# Patient Record
Sex: MALE | Race: WHITE | Employment: OTHER | URBAN - NONMETROPOLITAN AREA
[De-identification: names, ages, dates, MRNs, and addresses within clinical notes are randomized per-mention and may not be internally consistent; named-entity substitution may affect disease eponyms.]

---

## 2017-06-05 ENCOUNTER — HOSPITAL ENCOUNTER (OUTPATIENT)
Dept: NON INVASIVE DIAGNOSTICS | Age: 32
Discharge: HOME | End: 2017-06-05
Payer: COMMERCIAL

## 2017-06-05 LAB
LV EF: 60 %
LVEF MODALITY: NORMAL

## 2017-06-05 PROCEDURE — 93306 TTE W/DOPPLER COMPLETE: CPT

## 2017-06-05 PROCEDURE — 93225 XTRNL ECG REC<48 HRS REC: CPT

## 2021-05-21 ENCOUNTER — HOSPITAL ENCOUNTER (OUTPATIENT)
Dept: NON INVASIVE DIAGNOSTICS | Age: 36
Discharge: HOME | End: 2021-05-21
Payer: COMMERCIAL

## 2021-05-21 PROCEDURE — 93226 XTRNL ECG REC<48 HR SCAN A/R: CPT

## 2021-05-21 PROCEDURE — 93225 XTRNL ECG REC<48 HRS REC: CPT

## 2021-05-25 LAB
ACQUISITION DURATION: NORMAL S
AVERAGE HEART RATE: 87 BPM
EKG DIAGNOSIS: NORMAL
HOLTER MAX HEART RATE: 136 BPM
HOOKUP DATE: NORMAL
HOOKUP TIME: NORMAL
MAX HEART RATE TIME/DATE: NORMAL
MIN HEART RATE TIME/DATE: NORMAL
MIN HEART RATE: 60 BPM
NUMBER OF QRS COMPLEXES: NORMAL
NUMBER OF SUPRAVENTRICULAR COUPLETS: 0
NUMBER OF SUPRAVENTRICULAR ECTOPICS: 4
NUMBER OF SUPRAVENTRICULAR ISOLATED BEATS: 4
NUMBER OF VENTRICULAR BIGEMINAL CYCLES: 0
NUMBER OF VENTRICULAR COUPLETS: 0
NUMBER OF VENTRICULAR ECTOPICS: 15

## 2021-07-15 ENCOUNTER — HOSPITAL ENCOUNTER (EMERGENCY)
Age: 36
Discharge: ANOTHER HOSPITAL | End: 2021-07-16
Attending: FAMILY MEDICINE
Payer: COMMERCIAL

## 2021-07-15 DIAGNOSIS — R45.851 DEPRESSION WITH SUICIDAL IDEATION: Primary | ICD-10-CM

## 2021-07-15 DIAGNOSIS — F32.A DEPRESSION WITH SUICIDAL IDEATION: Primary | ICD-10-CM

## 2021-07-15 DIAGNOSIS — F10.929 ACUTE ALCOHOLIC INTOXICATION WITH COMPLICATION (HCC): ICD-10-CM

## 2021-07-15 LAB
ABSOLUTE EOS #: 0.09 K/UL (ref 0–0.44)
ABSOLUTE IMMATURE GRANULOCYTE: <0.03 K/UL (ref 0–0.3)
ABSOLUTE LYMPH #: 1.75 K/UL (ref 1.1–3.7)
ABSOLUTE MONO #: 0.58 K/UL (ref 0.1–1.2)
AMPHETAMINE SCREEN URINE: NEGATIVE
BARBITURATE SCREEN URINE: NEGATIVE
BASOPHILS # BLD: 1 % (ref 0–2)
BASOPHILS ABSOLUTE: 0.04 K/UL (ref 0–0.2)
BENZODIAZEPINE SCREEN, URINE: NEGATIVE
BUPRENORPHINE URINE: NEGATIVE
CANNABINOID SCREEN URINE: NEGATIVE
COCAINE METABOLITE, URINE: NEGATIVE
DIFFERENTIAL TYPE: NORMAL
EOSINOPHILS RELATIVE PERCENT: 2 % (ref 1–4)
HCT VFR BLD CALC: 40.9 % (ref 40.7–50.3)
HEMOGLOBIN: 13.7 G/DL (ref 13–17)
IMMATURE GRANULOCYTES: 0 %
LYMPHOCYTES # BLD: 29 % (ref 24–43)
MCH RBC QN AUTO: 31.7 PG (ref 25.2–33.5)
MCHC RBC AUTO-ENTMCNC: 33.5 G/DL (ref 28.4–34.8)
MCV RBC AUTO: 94.7 FL (ref 82.6–102.9)
MDMA URINE: NORMAL
METHADONE SCREEN, URINE: NEGATIVE
METHAMPHETAMINE, URINE: NEGATIVE
MONOCYTES # BLD: 10 % (ref 3–12)
NRBC AUTOMATED: 0 PER 100 WBC
OPIATES, URINE: NEGATIVE
OXYCODONE SCREEN URINE: NEGATIVE
PDW BLD-RTO: 12.7 % (ref 11.8–14.4)
PHENCYCLIDINE, URINE: NEGATIVE
PLATELET # BLD: 159 K/UL (ref 138–453)
PLATELET ESTIMATE: NORMAL
PMV BLD AUTO: 9.6 FL (ref 8.1–13.5)
PROPOXYPHENE, URINE: NEGATIVE
RBC # BLD: 4.32 M/UL (ref 4.21–5.77)
RBC # BLD: NORMAL 10*6/UL
SEG NEUTROPHILS: 58 % (ref 36–65)
SEGMENTED NEUTROPHILS ABSOLUTE COUNT: 3.53 K/UL (ref 1.5–8.1)
TEST INFORMATION: NORMAL
TRICYCLIC ANTIDEPRESSANTS, UR: NEGATIVE
WBC # BLD: 6 K/UL (ref 3.5–11.3)
WBC # BLD: NORMAL 10*3/UL

## 2021-07-15 PROCEDURE — 36415 COLL VENOUS BLD VENIPUNCTURE: CPT

## 2021-07-15 PROCEDURE — 83735 ASSAY OF MAGNESIUM: CPT

## 2021-07-15 PROCEDURE — 93005 ELECTROCARDIOGRAM TRACING: CPT | Performed by: FAMILY MEDICINE

## 2021-07-15 PROCEDURE — 80143 DRUG ASSAY ACETAMINOPHEN: CPT

## 2021-07-15 PROCEDURE — 80306 DRUG TEST PRSMV INSTRMNT: CPT

## 2021-07-15 PROCEDURE — G0480 DRUG TEST DEF 1-7 CLASSES: HCPCS

## 2021-07-15 PROCEDURE — 85025 COMPLETE CBC W/AUTO DIFF WBC: CPT

## 2021-07-15 PROCEDURE — 99285 EMERGENCY DEPT VISIT HI MDM: CPT

## 2021-07-15 PROCEDURE — 80053 COMPREHEN METABOLIC PANEL: CPT

## 2021-07-15 PROCEDURE — 87635 SARS-COV-2 COVID-19 AMP PRB: CPT

## 2021-07-15 PROCEDURE — 80179 DRUG ASSAY SALICYLATE: CPT

## 2021-07-15 RX ORDER — IBUPROFEN 200 MG
800 TABLET ORAL EVERY 8 HOURS PRN
COMMUNITY

## 2021-07-15 RX ORDER — METOPROLOL SUCCINATE 50 MG/1
50 TABLET, EXTENDED RELEASE ORAL DAILY
Status: ON HOLD | COMMUNITY
End: 2021-07-16 | Stop reason: HOSPADM

## 2021-07-15 ASSESSMENT — PATIENT HEALTH QUESTIONNAIRE - PHQ9: SUM OF ALL RESPONSES TO PHQ QUESTIONS 1-9: 9

## 2021-07-16 ENCOUNTER — HOSPITAL ENCOUNTER (INPATIENT)
Age: 36
LOS: 1 days | Discharge: HOME | DRG: 881 | End: 2021-07-17
Attending: PSYCHIATRY & NEUROLOGY | Admitting: PSYCHIATRY & NEUROLOGY
Payer: COMMERCIAL

## 2021-07-16 VITALS
HEIGHT: 69 IN | BODY MASS INDEX: 20.73 KG/M2 | DIASTOLIC BLOOD PRESSURE: 97 MMHG | RESPIRATION RATE: 20 BRPM | OXYGEN SATURATION: 96 % | SYSTOLIC BLOOD PRESSURE: 180 MMHG | WEIGHT: 140 LBS | HEART RATE: 76 BPM

## 2021-07-16 PROBLEM — R45.851 DEPRESSION WITH SUICIDAL IDEATION: Status: ACTIVE | Noted: 2021-07-16

## 2021-07-16 PROBLEM — F10.10 ALCOHOL ABUSE: Status: ACTIVE | Noted: 2021-07-16

## 2021-07-16 PROBLEM — F32.A DEPRESSION WITH SUICIDAL IDEATION: Status: ACTIVE | Noted: 2021-07-16

## 2021-07-16 LAB
ACETAMINOPHEN LEVEL: <5 UG/ML (ref 10–30)
ALBUMIN SERPL-MCNC: 4.2 G/DL (ref 3.5–5.2)
ALBUMIN/GLOBULIN RATIO: 2.2 (ref 1–2.5)
ALP BLD-CCNC: 45 U/L (ref 40–129)
ALT SERPL-CCNC: 21 U/L (ref 5–41)
ANION GAP SERPL CALCULATED.3IONS-SCNC: 10 MMOL/L (ref 9–17)
AST SERPL-CCNC: 28 U/L
BILIRUB SERPL-MCNC: 0.24 MG/DL (ref 0.3–1.2)
BUN BLDV-MCNC: 8 MG/DL (ref 6–20)
BUN/CREAT BLD: 9 (ref 9–20)
CALCIUM SERPL-MCNC: 8 MG/DL (ref 8.6–10.4)
CHLORIDE BLD-SCNC: 109 MMOL/L (ref 98–107)
CO2: 23 MMOL/L (ref 20–31)
CREAT SERPL-MCNC: 0.85 MG/DL (ref 0.7–1.2)
EKG ATRIAL RATE: 63 BPM
EKG P AXIS: 74 DEGREES
EKG P-R INTERVAL: 142 MS
EKG Q-T INTERVAL: 432 MS
EKG QRS DURATION: 86 MS
EKG QTC CALCULATION (BAZETT): 442 MS
EKG R AXIS: 78 DEGREES
EKG T AXIS: 47 DEGREES
EKG VENTRICULAR RATE: 63 BPM
ETHANOL PERCENT: 0.11 %
ETHANOL PERCENT: 0.15 %
ETHANOL: 106 MG/DL
ETHANOL: 155 MG/DL
GFR AFRICAN AMERICAN: >60 ML/MIN
GFR NON-AFRICAN AMERICAN: >60 ML/MIN
GFR SERPL CREATININE-BSD FRML MDRD: ABNORMAL ML/MIN/{1.73_M2}
GFR SERPL CREATININE-BSD FRML MDRD: ABNORMAL ML/MIN/{1.73_M2}
GLUCOSE BLD-MCNC: 89 MG/DL (ref 70–99)
MAGNESIUM: 2.1 MG/DL (ref 1.6–2.6)
POTASSIUM SERPL-SCNC: 3.4 MMOL/L (ref 3.7–5.3)
SALICYLATE LEVEL: <1 MG/DL (ref 3–10)
SARS-COV-2, RAPID: NOT DETECTED
SODIUM BLD-SCNC: 142 MMOL/L (ref 135–144)
SPECIMEN DESCRIPTION: NORMAL
TOTAL PROTEIN: 6.1 G/DL (ref 6.4–8.3)

## 2021-07-16 PROCEDURE — G0480 DRUG TEST DEF 1-7 CLASSES: HCPCS

## 2021-07-16 PROCEDURE — 1240000000 HC EMOTIONAL WELLNESS R&B

## 2021-07-16 PROCEDURE — 6370000000 HC RX 637 (ALT 250 FOR IP)

## 2021-07-16 PROCEDURE — 99223 1ST HOSP IP/OBS HIGH 75: CPT | Performed by: PSYCHIATRY & NEUROLOGY

## 2021-07-16 PROCEDURE — APPSS60 APP SPLIT SHARED TIME 46-60 MINUTES: Performed by: NURSE PRACTITIONER

## 2021-07-16 PROCEDURE — 6370000000 HC RX 637 (ALT 250 FOR IP): Performed by: NURSE PRACTITIONER

## 2021-07-16 PROCEDURE — 93010 ELECTROCARDIOGRAM REPORT: CPT | Performed by: INTERNAL MEDICINE

## 2021-07-16 RX ORDER — ACETAMINOPHEN 325 MG/1
650 TABLET ORAL EVERY 4 HOURS PRN
Status: DISCONTINUED | OUTPATIENT
Start: 2021-07-16 | End: 2021-07-17 | Stop reason: HOSPADM

## 2021-07-16 RX ORDER — LORAZEPAM 2 MG/ML
4 INJECTION INTRAMUSCULAR
Status: DISCONTINUED | OUTPATIENT
Start: 2021-07-16 | End: 2021-07-17 | Stop reason: HOSPADM

## 2021-07-16 RX ORDER — METOPROLOL SUCCINATE 50 MG/1
50 TABLET, EXTENDED RELEASE ORAL DAILY
Qty: 30 TABLET | Refills: 3 | Status: SHIPPED | OUTPATIENT
Start: 2021-07-17

## 2021-07-16 RX ORDER — HYDROXYZINE 50 MG/1
50 TABLET, FILM COATED ORAL 3 TIMES DAILY PRN
Status: DISCONTINUED | OUTPATIENT
Start: 2021-07-16 | End: 2021-07-17 | Stop reason: HOSPADM

## 2021-07-16 RX ORDER — M-VIT,TX,IRON,MINS/CALC/FOLIC 27MG-0.4MG
1 TABLET ORAL DAILY
Qty: 30 TABLET | Refills: 0 | Status: SHIPPED | OUTPATIENT
Start: 2021-07-16

## 2021-07-16 RX ORDER — GAUZE BANDAGE 2" X 2"
100 BANDAGE TOPICAL DAILY
Status: DISCONTINUED | OUTPATIENT
Start: 2021-07-16 | End: 2021-07-17 | Stop reason: HOSPADM

## 2021-07-16 RX ORDER — THIAMINE MONONITRATE (VIT B1) 100 MG
100 TABLET ORAL DAILY
Qty: 30 TABLET | Refills: 0 | Status: SHIPPED | OUTPATIENT
Start: 2021-07-16

## 2021-07-16 RX ORDER — IBUPROFEN 400 MG/1
400 TABLET ORAL EVERY 6 HOURS PRN
Status: DISCONTINUED | OUTPATIENT
Start: 2021-07-16 | End: 2021-07-17 | Stop reason: HOSPADM

## 2021-07-16 RX ORDER — POLYETHYLENE GLYCOL 3350 17 G/17G
17 POWDER, FOR SOLUTION ORAL DAILY PRN
Status: DISCONTINUED | OUTPATIENT
Start: 2021-07-16 | End: 2021-07-17 | Stop reason: HOSPADM

## 2021-07-16 RX ORDER — TRAZODONE HYDROCHLORIDE 50 MG/1
50 TABLET ORAL NIGHTLY PRN
Qty: 30 TABLET | Refills: 0 | Status: ON HOLD | OUTPATIENT
Start: 2021-07-16 | End: 2022-06-19 | Stop reason: HOSPADM

## 2021-07-16 RX ORDER — LORAZEPAM 1 MG/1
1 TABLET ORAL
Status: DISCONTINUED | OUTPATIENT
Start: 2021-07-16 | End: 2021-07-17 | Stop reason: HOSPADM

## 2021-07-16 RX ORDER — M-VIT,TX,IRON,MINS/CALC/FOLIC 27MG-0.4MG
1 TABLET ORAL DAILY
Status: DISCONTINUED | OUTPATIENT
Start: 2021-07-16 | End: 2021-07-17 | Stop reason: HOSPADM

## 2021-07-16 RX ORDER — LORAZEPAM 2 MG/ML
1 INJECTION INTRAMUSCULAR
Status: DISCONTINUED | OUTPATIENT
Start: 2021-07-16 | End: 2021-07-17 | Stop reason: HOSPADM

## 2021-07-16 RX ORDER — MAGNESIUM HYDROXIDE/ALUMINUM HYDROXICE/SIMETHICONE 120; 1200; 1200 MG/30ML; MG/30ML; MG/30ML
30 SUSPENSION ORAL EVERY 6 HOURS PRN
Status: DISCONTINUED | OUTPATIENT
Start: 2021-07-16 | End: 2021-07-17 | Stop reason: HOSPADM

## 2021-07-16 RX ORDER — LORAZEPAM 2 MG/ML
3 INJECTION INTRAMUSCULAR
Status: DISCONTINUED | OUTPATIENT
Start: 2021-07-16 | End: 2021-07-17 | Stop reason: HOSPADM

## 2021-07-16 RX ORDER — LORAZEPAM 1 MG/1
4 TABLET ORAL
Status: DISCONTINUED | OUTPATIENT
Start: 2021-07-16 | End: 2021-07-17 | Stop reason: HOSPADM

## 2021-07-16 RX ORDER — LORAZEPAM 2 MG/ML
2 INJECTION INTRAMUSCULAR
Status: DISCONTINUED | OUTPATIENT
Start: 2021-07-16 | End: 2021-07-17 | Stop reason: HOSPADM

## 2021-07-16 RX ORDER — LORAZEPAM 1 MG/1
3 TABLET ORAL
Status: DISCONTINUED | OUTPATIENT
Start: 2021-07-16 | End: 2021-07-17 | Stop reason: HOSPADM

## 2021-07-16 RX ORDER — TRAZODONE HYDROCHLORIDE 50 MG/1
50 TABLET ORAL NIGHTLY PRN
Status: DISCONTINUED | OUTPATIENT
Start: 2021-07-16 | End: 2021-07-17 | Stop reason: HOSPADM

## 2021-07-16 RX ORDER — LORAZEPAM 1 MG/1
2 TABLET ORAL
Status: DISCONTINUED | OUTPATIENT
Start: 2021-07-16 | End: 2021-07-17 | Stop reason: HOSPADM

## 2021-07-16 RX ORDER — METOPROLOL SUCCINATE 50 MG/1
50 TABLET, EXTENDED RELEASE ORAL DAILY
Status: DISCONTINUED | OUTPATIENT
Start: 2021-07-16 | End: 2021-07-17 | Stop reason: HOSPADM

## 2021-07-16 RX ADMIN — THIAMINE HCL TAB 100 MG 100 MG: 100 TAB at 21:25

## 2021-07-16 RX ADMIN — HYDROXYZINE HYDROCHLORIDE 50 MG: 50 TABLET, FILM COATED ORAL at 21:25

## 2021-07-16 RX ADMIN — TRAZODONE HYDROCHLORIDE 50 MG: 50 TABLET ORAL at 21:25

## 2021-07-16 RX ADMIN — Medication 1 TABLET: at 21:24

## 2021-07-16 ASSESSMENT — ENCOUNTER SYMPTOMS
RESPIRATORY NEGATIVE: 1
GASTROINTESTINAL NEGATIVE: 1
EYES NEGATIVE: 1

## 2021-07-16 ASSESSMENT — PAIN SCALES - GENERAL: PAINLEVEL_OUTOF10: 0

## 2021-07-16 ASSESSMENT — SLEEP AND FATIGUE QUESTIONNAIRES
DO YOU HAVE DIFFICULTY SLEEPING: YES
DIFFICULTY STAYING ASLEEP: NO
AVERAGE NUMBER OF SLEEP HOURS: 6
SLEEP PATTERN: DIFFICULTY FALLING ASLEEP
DIFFICULTY FALLING ASLEEP: YES
RESTFUL SLEEP: NO
DO YOU USE A SLEEP AID: NO
DIFFICULTY ARISING: NO

## 2021-07-16 ASSESSMENT — LIFESTYLE VARIABLES: HISTORY_ALCOHOL_USE: YES

## 2021-07-16 ASSESSMENT — PATIENT HEALTH QUESTIONNAIRE - PHQ9: SUM OF ALL RESPONSES TO PHQ QUESTIONS 1-9: 10

## 2021-07-16 NOTE — ED NOTES
While doing reassessment and vitals this writer reminded pt that he was being transferred and that the squad should be here around 0830 to get him. Pt states that he feels fine, does not have any suicidal thoughts and was told that once he sobered up that he could go home. This writer stated to pt that he was informed around 0030 that he was being  [place] slipped and transferred. This writer said she would have Dr. Yue Hook talk to him. Dr. Yue Hook went to talk to patient and stated that he would not reverse the previous providers orders. Pt told Dr. Yue Hook that he would leave the room. This writer provided pt with coffee. Pt declined a meal tray.       Arina Galindo RN  07/16/21 7746

## 2021-07-16 NOTE — BH NOTE
Patient given tobacco quitline number 54968490591 at this time, refusing to call at this time, states \" I just dont want to quit now\"- patient given information as to the dangers of long term tobacco use. Continue to reinforce the importance of tobacco cessation.

## 2021-07-16 NOTE — CARE COORDINATION
BHI Biopsychosocial Assessment    Current Level of Psychosocial Functioning     Independent   Dependent    Minimal Assist X   Psychosocial High Risk Factors (check all that apply)    Unable to obtain meds   Chronic illness/pain    Substance abuse X Alcohol   Lack of Family Support X  Financial stress X  Isolation X  Inadequate Community Resources X  Suicide attempt(s)   Not taking medications X  Victim of crime   Developmental Delay  Unable to manage personal needs  X  Age 72 or older   Homeless  No transportation   Readmission within 30 days  Unemployment  Traumatic Event    Psychiatric Advanced Directives: none reported     Family to Involve in Treatment: lack of family support     Sexual Orientation:  PRAFUL    Patient Strengths: insurance,     Patient Barriers: recent break up with girlfriend, not linked with [facility], presents on admission with suicidal ideation, Alcohol abuse. Opiate Education Provided: N/A PT denies and does not have a documented history of Opiate or Heroin use/abuse. Pt reports that he drank 20 beers prior to admission and his Ethanol level on admission to the ED was .155. CMHC/mental health history: Pt is not currently linked with a [facility], He lives in [address] [place]   medication management, group/individual therapies, family meetings, psycho -education, treatment team meetings to assist with stabilization    Initial Discharge Plan:  Pt reports a plan to return to his home in [facility]. Pt will also be linked to a CM in [place] for mental health treatment. Clinical Summary:  Agatha Estrada is a 29 y.o. male who presents on admission with suicidal ideation. It was documented in ED notes that  PT  got really drunk due to relationship issues and texted a bunch of family and friends that he is going to ended his life today. . It was also documented that his family went to his house and removed his  Firearms.  It was documented that when police arrived at PT's house he was

## 2021-07-16 NOTE — H&P
Department of Psychiatry  Attending Physician Psychiatric Assessment     Reason for Admission to Psychiatric Unit:  Concerns about patient's safety in the community    CHIEF COMPLAINT: Depression with suicidal ideation    History obtained from: Patient, electronic medical record          HISTORY OF PRESENT ILLNESS:    Angelina Soto is a 29 y.o. male who has a past medical history of hypertension and aortic valve regurgitation. Patient presented to the ED via [employer] after he contacted family members stating he was going to end his life. ED note:  Abdoulaye sEtrada is a29 y.o. male who presents to the emergency department after having been brought in by the police. Apparently he got really drunk due to relationship issues and texted a bunch of family and friends that he is going to end it today. Family went to his house and removed firearm from there such that when police came to the house they found him intoxicated, sleeping. He is awake now states that he is not suicidal however the family reports that he <texted otherwise>. He does have a history of hypertension and depression. He apparently has talked in the past about suicide. He reports to me that he is not suicidal or homicidal at this time. There is apparent reports that he drank 20 beers today. Mr. Tomasa Moody was seen for initial evaluation today. Nursing staff report patient has been cooperative yet anxious today and his interactions with peers and staff have been appropriate. He has been keeping mostly to himself and pacing around the unit. He did eat lunch and dinner today and is able to make his needs known. He was observed pacing slowly around the unit and anxious, but was agreeable to conversation on approach and sat with writer in the privacy of his room.  He was very adamant that he did not need to be here and stated \"look, I am not suicidal, I had way too much to drink, I love myself way too much to hurt myself and I really do not need to be here; I just want to go home and sleep in my own bed\". He is denying any current symptoms of depression. He is also denying suicidal ideation and stated he obviously said and did something stupid when he was intoxicated and that he realizes he needs to drink a lot less. He endorses recent interpersonal conflict with a former girlfriend, but states he has had no thoughts of wanting to harm himself or anybody else when sober. He endorses some symptoms of depression \"several years ago\" as a young adult and was put on an antidepressant, which he could not recall the name of and reports taking it for only a few weeks before he quit. Patient reports daily alcohol intake between 4-8 beers and feels that he does not have a drinking problem. He reports being able to quit anytime and denies ever experiencing withdrawal symptoms after stopping for several days. He denies any illicit substance abuse and reports smoking 1/2 to 1 pack of cigarettes a day. He is also denying homicidal ideation, frequent anxiety, panic attacks, lana or hypomania, phobias, obsessions and compulsions, body and vocal tics, hallucinations, delusions, paranoia, history of trauma, or symptoms of PTSD. Patient stated he has a current prescription for metoprolol XL 50 mg by mouth daily. He stated he quit taking it 2 days ago because he did not like the way it made him feel and he did not feel that he needed it. Patient was advised against quitting beta-blocker \"cold turkey\" and was encouraged to continue taking it until he can follow-up with his family physician to avoid rebound hypertension or cardiac event. Patient is not open to starting an antidepressant at this time. Patient agrees to contract for safety on the unit.          History of head trauma: [] Yes [x] No    History of seizures: [] Yes [x] No    History of violence or aggression: [] Yes [x] No         PSYCHIATRIC HISTORY:  [x] Yes [] No    Not currently linked  0 lifetime suicide attempts  0 psychiatric hospital admissions    Past psychiatric medications includes: Unknown antidepressant    Adverse reactions from psychotropic medications: [] Yes [x] No         Lifetime Psychiatric Review of Systems         Depression: Endorses     Anxiety: Endorses     Panic Attacks: Denies     Mckenna or Hypomania: Denies     Phobias: Denies     Obsessions and Compulsions: Denies     Body or Vocal Tics: Denies     Visual Hallucinations: Denies     Auditory Hallucinations: Denies     Delusions/Paranoia: Denies     PTSD: Denies    Past Medical History:        Diagnosis Date    Heart abnormality     \"bad aortic valve\"     Hypertension        Past Surgical History:    History reviewed. No pertinent surgical history. Allergies:  Bee pollen         Social History:    Born in: Born and raised in [facility]  Family: Parents not  when he was born, both living but no longer together, has 2 sisters, 1 brother  from [address]  Highest Level of Education: High school graduate  Occupation:   Marital Status: Single  Children: 0  Residence: Lives alone in home  Stressors: Long work hours; interpersonal relationships  Patient Assets/Supportive Factors: Supportive family         DRUG USE HISTORY  Social History     Tobacco Use   Smoking Status Current Every Day Smoker    Types: Cigarettes   Smokeless Tobacco Former User     Social History     Substance and Sexual Activity   Alcohol Use Yes     Social History     Substance and Sexual Activity   Drug Use Never     7/15/2021 EtOH 0.155  7/15/2021 UDS negative       LEGAL HISTORY:   HISTORY OF INCARCERATION: [] Yes [x] No    Family History:   History reviewed. No pertinent family history. Psychiatric Family History  Patient denies pertinent psychiatric family history.      Suicides in family: [] Yes [x] No    Substance use in family: [x] Yes [] No   Father was an alcoholic         PHYSICAL EXAM:  Vitals:  BP (!) 137/98   Pulse 56   Temp 98.6 °F (37 °C) (Oral)   Resp 14   Ht 5' 9\" (1.753 m)   Wt 140 lb (63.5 kg)   SpO2 99%   BMI 20.67 kg/m²     LABS:  Labs reviewed: [x] Yes  Last EKG in EMR reviewed: [x] Yes          Review of Systems   Constitutional: Negative for chills and weight loss. HENT: Negative for ear pain and nosebleeds. Eyes: Negative for blurred vision and photophobia. Respiratory: Negative for cough, shortness of breath and wheezing. Cardiovascular: Negative for chest pain and palpitations. Gastrointestinal: Negative for abdominal pain, diarrhea and vomiting. Genitourinary: Negative for dysuria and urgency. Musculoskeletal: Negative for falls and joint pain. Skin: Negative for itching and rash. Neurological: Negative for tremors, seizures and weakness. Endo/Heme/Allergies: Does not bruise/bleed easily. Physical Exam:   Constitutional:  Appears well-developed and well-nourished, no acute distress. HENT:   Head: Normocephalic and atraumatic. Eyes: Conjunctivae are normal. Right eye exhibits no discharge. Left eye exhibits no discharge. No scleral icterus. Neck: Normal range of motion. Neck supple. Pulmonary/Chest:  No respiratory distress or accessory muscle use, no wheezing. Cardiac: Regular rate and rhythm. Abdominal: Soft. Non-tender. Exhibits no distension. Musculoskeletal: Normal range of motion. Exhibits no edema. Neurological: cranial nerves II-XII grossly in tact, normal gait and station. Skin: Skin is warm and dry. Patient is not diaphoretic. No erythema. Mental Status Examination:    Level of consciousness: Awake and alert  Appearance:  Appropriate attire, seated on bed, fair grooming   Behavior/Motor: Approachable, no psychomotor abnormalities noted  Attitude toward examiner: Cooperative, attentive, good eye contact  Speech: Normal rate, volume, and tone.   Mood: Anxious  Affect:  Appropriate  Thought processes:  Goal directed, linear, coherent  Thought content: Denies suicidal ideation Denies homicidal ideations               Denies hallucinations              Denies delusions              Denies paranoia  Cognition:  Oriented to self, location, time, situation  Concentration: Clinically adequate  Memory: Intact  Insight & Judgment: Fair         DSM-5 Diagnosis    Principal Problem: Depression with suicidal ideation    Alcohol abuse    Psychosocial and Contextual factors:  Financial   Occupational X  Relationship X  Legal   Living situation   Educational     Past Medical History:   Diagnosis Date    Heart abnormality     \"bad aortic valve\"     Hypertension      TREATMENT PLAN  Continue inpatient psychiatric treatment. Home medications reviewed: Reordered metoprolol XL 50 mg by mouth daily  Order CIWA scale with Ativan as needed   Order thiamine 100 mg by mouth daily  Order multivitamin daily  Problem list updated. Monitor need and frequency of PRN medications. Attempt to develop insight. Follow-up daily while inpatient. Reviewed risks and benefits as well as potential side effects with patient. To be determined by attending physician    CONSULTS [] Yes [x] No    Risk Management: close watch per standard protocol    Psychotherapy: participation in milieu and group and individual sessions with Attending Physician,  and Physician Assistant/CNP    Estimated length of stay:  2-14 days    GENERAL PATIENT/FAMILY EDUCATION  Patient will understand basic signs and symptoms, patient will understand benefits/risks and potential side effects from proposed medications, and patient will understand their role in recovery. Family is active in patient's care. Patient assets that may be helpful during treatment include: Intent to participate and engage in treatment, sufficient fund of knowledge and intellect to understand and utilize treatments. Goals:    1) Remission of suicidal ideation. 2) Stabilization of symptoms prior to discharge.   3) Establish efficacy and tolerability of medications. Behavioral Services  Medicare Certification     Admission Day 1  I certify that this patient's inpatient psychiatric hospital admission is medically necessary for:    x (1) treatment which could reasonably be expected to improve this patient's condition, or    x (2) diagnostic study or its equivalent. Time Spent: 60 minutes    Jayson Bose is a 29 y.o. male being evaluated face to face    --SJ Horne CNP on 7/16/2021 at 6:43 PM    An electronic signature was used to authenticate this note. I independently saw and evaluated the patient. I reviewed the midlevel provider's documentation above. Any additional comments or changes to the midlevel provider's documentation are stated below otherwise agree with assessment. The patient was seen face-to-face. He told me that he has a happy life. He has been drinking too much. He estimates that he drank between 12 and 18 beers. He does not remember stating that he was feeling suicidal.  He cannot think of a reason why he would feel depressed enough to be suicidal.    The patient states that she has a number of health problems. He has an aortic valve problem and takes metoprolol for that. The metoprolol makes him tired. The patient acknowledges that he has a history of heavy drinking. He had cut down his drinking to about once a month. The patient is currently living alone. 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 The patient has 2 nephews ages 1 and 6 who he cares for. He has also had 3 stepchildren. He states his parents live close by and he sees them often. He states he is well supported. The patient denies any past history of suicide attempts. He has never been admitted to psychiatry and never been treated with psychotropic medications        Patient has a family history of alcoholism in his father.     The patient does not believe he needs any antidepressants because he does not normally feel depressed. The patient is discharge focused because he has to return to work on Monday      PLAN  No psychotropic medications ordered. Alcohol withdrawal orders per [facility] protocol  Attempt to develop insight  Psycho-education conducted. Supportive Therapy conducted.   Probable discharge is in 2 days  Follow-up daily while on inpatient unit    Electronically signed by Matthew Anguiano MD on 7/16/21 at 7:06 PM EDT

## 2021-07-16 NOTE — ED NOTES
Pt states he began drinking at 9am. Intake of beers was roughly 20 throughout the day. Pt states he has recently broken up with S/O who is also neighbor. Pt states he was intoxicated when interacting with family. States he owns firearms and while having one in his possession tonight, he denies removing it from its case. Family reportedly removed firearms from patients home.       Elise Sutherland RN  07/16/21 2041

## 2021-07-16 NOTE — PROGRESS NOTES
Behavioral Services  Medicare Certification Upon Admission    I certify that this patient's inpatient psychiatric hospital admission is medically necessary for:    [x] (1) Treatment which could reasonably be expected to improve this patient's condition,       [x] (2) Or for diagnostic study;     AND     [x](2) The inpatient psychiatric services are provided while the individual is under the care of a physician and are included in the individualized plan of care.     Estimated length of stay/service -5-9 days    Plan for post-hospital care -outpatient care    Electronically signed by Matthew Anguiano MD on 7/16/2021 at 4:49 PM

## 2021-07-16 NOTE — GROUP NOTE
Group Therapy Note    Date: 7/16/2021    Group Start Time: 1330  Group End Time: 7768  Group Topic: Recreational    [facility] CLIVE Ramirez, CTRS        Group Therapy Note    Attendees: 5    Pt did not attend Recreational Therapy group at 1330 d/t resting in room despite staff invitation to attend. 1:1 talk time offered as alternative to group session, pt declined.           Signature:  Jennifer Dominguez

## 2021-07-16 NOTE — PLAN OF CARE
[facility]  Initial Interdisciplinary Treatment Plan NO      Original treatment plan Date & Time: 7/16/2021  1248    Admission Type:  Admission Type:  Involuntary    Reason for admission:   Reason for Admission: suicidal ideations while intoxicated    Estimated Length of Stay:  5-7days  Estimated Discharge Date: to be determined by physician    PATIENT STRENGTHS:  Patient Strengths:Strengths: Communication, Positive Support, Employment, Motivated, Social Skills  Patient Strengths and Limitations:Limitations: Inappropriate/potentially harmful leisure interests  Addictive Behavior: Addictive Behavior  In the past 3 months, have you felt or has someone told you that you have a problem with:  : None  Do you have a history of Chemical Use?: No  Do you have a history of Alcohol Use?: Yes  Do you have a history of Street Drug Abuse?: No  Histroy of Prescripton Drug Abuse?: No  Medical Problems:  Past Medical History:   Diagnosis Date    Heart abnormality     \"bad aortic valve\"     Hypertension      Status EXAM:Status and Exam  Normal: No  Facial Expression: Flat  Affect: Appropriate  Level of Consciousness: Alert  Mood:Normal: No  Mood: Anxious, Sad  Motor Activity:Normal: Yes  Interview Behavior: Cooperative  Preception: [place] to Person, [place] to Place, [place] to Situation, [place] to Time  Attention:Normal: No  Attention: Distractible  Thought Processes: Circumstantial  Thought Content:Normal: Yes (Logical)  Hallucinations: None  Delusions: No  Memory:Normal: Yes  Insight and Judgment: No  Insight and Judgment: Poor Insight  Present Suicidal Ideation: No  Present Homicidal Ideation: No    EDUCATION:   Learner Progress Toward Treatment Goals: reviewed group plans and strategies for care    Method:group therapy, medication compliance, individualized assessments and care planning    Outcome: needs reinforcement    PATIENT GOALS: to be discussed with patient within 72 hours    PLAN/TREATMENT RECOMMENDATIONS: continue group therapy , medications compliance, goal setting, individualized assessments and care, continue to monitor pt on unit      SHORT-TERM GOALS:   Time frame for Short-Term Goals: 5-7 days    LONG-TERM GOALS:  Time frame for Long-Term Goals: 6 months  Members Present in Team Meeting: See Signature Sheet    BRUNO Martinez

## 2021-07-16 NOTE — ED PROVIDER NOTES
[facility]  EMERGENCY DEPARTMENT ENCOUNTER      Pt Name: Alla Nicholson  MRN: [id number]  [place] 1985  Date of evaluation: 7/15/2021  Provider: Joe Nicholson MD    CHIEF COMPLAINT     Chief Complaint   Patient presents with    Suicidal     Calling family to state he \"was saying his final goodbyes\"     Other     Drank \"20 beers\" today         HISTORY OF PRESENT ILLNESS   (Location/Symptom, Timing/Onset, Context/Setting,Quality, Duration, Modifying Factors, Severity)  Note limiting factors. Erma Estrada is a29 y.o. male who presents to the emergency department      This is a 29years old patient presented to ER after having brought in by the police. Apparently he got really drunk due to relationship issues and texted a bunch of family and friends that he is going to ended today. Family went to his house and removed firearm from there such that when police came to the house they found him intoxicated, sleeping. He is awake now states that he is not suicidal however the family reports that he tolerated body otherwise. He does have a history of hypertension and depression. He apparently has talked in the past about suicide. He reports to me that he is not suicidal or homicidal at this time. There is apparent reports that he has drank 20 beers today. Nursing Notes werereviewed. REVIEW OF SYSTEMS    (2-9 systems for level 4, 10 or more for level 5)     Review of Systems   Constitutional: Negative. HENT: Negative. Eyes: Negative. Respiratory: Negative. Cardiovascular: Negative. Gastrointestinal: Negative. Endocrine: Negative. Genitourinary: Negative. Musculoskeletal: Negative. Skin: Negative. Neurological: Negative. Except as noted above the remainder of the review of systems was reviewed and negative.        PAST MEDICAL HISTORY     Past Medical History:   Diagnosis Date    Heart abnormality     \"bad aortic valve\"     Hypertension SURGICALHISTORY     History reviewed. No pertinent surgical history. CURRENT MEDICATIONS       Previous Medications    IBUPROFEN (ADVIL;MOTRIN) 200 MG TABLET    Take 800 mg by mouth every 8 hours as needed for Pain (takes daily for headache)    METOPROLOL SUCCINATE (TOPROL XL) 50 MG EXTENDED RELEASE TABLET    Take 50 mg by mouth daily            Bee pollen    FAMILY HISTORY     History reviewed. No pertinent family history. SOCIAL HISTORY       Social History     Socioeconomic History    Marital status: Single     Spouse name: None    Number of children: None    Years of education: None    Highest education level: None   Occupational History    None   Tobacco Use    Smoking status: Current Every Day Smoker     Types: Cigarettes    Smokeless tobacco: Former User   Vaping Use    Vaping Use: Every day    Substances: Nicotine   Substance and Sexual Activity    Alcohol use: Yes    Drug use: Never    Sexual activity: Yes     Partners: Female   Other Topics Concern    None   Social History Narrative    None     Social Determinants of Health     Financial Resource Strain:     Difficulty of Paying Living Expenses:    Food Insecurity:     Worried About Running Out of Food in the Last Year:     [address] in the Last Year:    Transportation Needs:     Lack of Transportation (Medical):      Lack of Transportation (Non-Medical):    Physical Activity:     Days of Exercise per Week:     Minutes of Exercise per Session:    Stress:     Feeling of Stress :    Social Connections:     Frequency of Communication with Friends and Family:     Frequency of Social Gatherings with Friends and Family:     Attends Orthodoxy Services:     Active Member of Clubs or Organizations:     Attends Club or Organization Meetings:     Marital Status:    Intimate Partner Violence:     Fear of Current or Ex-Partner:     Emotionally Abused:     Physically Abused:     Sexually Abused:        SCREENINGS time of this note:    No orders to display         ED BEDSIDE ULTRASOUND:   Performed by ED Physician - none    LABS:  Labs Reviewed   COMPREHENSIVE METABOLIC PANEL W/ REFLEX TO MG FOR LOW K - Abnormal; Notable for the following components:       Result Value    Calcium 8.0 (*)     Potassium 3.4 (*)     Chloride 109 (*)     Total Bilirubin 0.24 (*)     Total Protein 6.1 (*)     All other components within normal limits   ETHANOL - Abnormal; Notable for the following components:    Ethanol 155 (*)     Ethanol percent 0.155 (*)     All other components within normal limits   ACETAMINOPHEN LEVEL - Abnormal; Notable for the following components:    Acetaminophen Level <5 (*)     All other components within normal limits   SALICYLATE LEVEL - Abnormal; Notable for the following components:    Salicylate Lvl <1 (*)     All other components within normal limits   ETHANOL - Abnormal; Notable for the following components:    Ethanol 106 (*)     Ethanol percent 0.106 (*)     All other components within normal limits   COVID-19, RAPID   URINE DRUG SCREEN   CBC WITH AUTO DIFFERENTIAL   MAGNESIUM       All other labs were within normal range ornot returned as of this dictation. EMERGENCY DEPARTMENT COURSE and DIFFERENTIAL DIAGNOSIS/MDM:   Vitals:    Vitals:    07/15/21 2238 07/15/21 2353 07/16/21 0000 07/16/21 0353   BP: (!) 195/100 (!) 138/96 (!) 168/96 104/71   Pulse: 66 71 84 59   Resp: 20 18 15 20   SpO2: 100% 100% 100% 100%   Weight: 140 lb (63.5 kg)      Height: 5' 9\" (1.753 m)              MDM  Number of Diagnoses or Management Options  Diagnosis management comments: She will be pink slipped.  He is belligerent and he does not want stay in the hospital. He was arguing with me by nurse and his family about how he is not suicidal he wants to go home he does not want to stay in the hospital. Patient is intoxicated however is very clear that he had a gun and he stated multiple times to multiple people that he is going to

## 2021-07-16 NOTE — BH NOTE
[facility]  Admission Note     Admission Type:   Admission Type:  Involuntary    Reason for admission:  Reason for Admission: suicidal ideations while intoxicated    PATIENT STRENGTHS:  Strengths: Communication, Positive Support, Employment, Motivated, Social Skills    Patient Strengths and Limitations:  Limitations: Inappropriate/potentially harmful leisure interests    Addictive Behavior:   Addictive Behavior  In the past 3 months, have you felt or has someone told you that you have a problem with:  : None  Do you have a history of Chemical Use?: No  Do you have a history of Alcohol Use?: Yes  Do you have a history of Street Drug Abuse?: No  Histroy of Prescripton Drug Abuse?: No    Medical Problems:   Past Medical History:   Diagnosis Date    Heart abnormality     \"bad aortic valve\"     Hypertension        Status EXAM:  Status and Exam  Normal: No  Facial Expression: Flat  Affect: Appropriate  Level of Consciousness: Alert  Mood:Normal: No  Mood: Anxious, Sad  Motor Activity:Normal: Yes  Interview Behavior: Cooperative  Preception: [place] to Person, [place] to Place, [place] to Situation, [place] to Time  Attention:Normal: No  Attention: Distractible  Thought Processes: Circumstantial  Thought Content:Normal: Yes (Logical)  Hallucinations: None  Delusions: No  Memory:Normal: Yes  Insight and Judgment: No  Insight and Judgment: Poor Insight  Present Suicidal Ideation: No  Present Homicidal Ideation: No    Tobacco Screening:  Practical Counseling, on admission, parth X, if applicable and completed (first 3 are required if patient doesn't refuse):            ( )  Recognizing danger situations (included triggers and roadblocks)                    ( )  Coping skills (new ways to manage stress, exercise, relaxation techniques, changing routine, distraction)                                                           ( )  Basic information about quitting (benefits of quitting, techniques in how to quit, available resources  ( ) Referral for counseling faxed to Cynthia                                           ( ) Patient refused counseling  ( ) Patient has not smoked in the last 30 days    Metabolic Screening:    No results found for: LABA1C    No results found for: CHOL  No results found for: TRIG  No results found for: HDL  No components found for: LDLCAL  No results found for: LABVLDL      Body mass index is 20.67 kg/m². BP Readings from Last 2 Encounters:   07/16/21 (!) 137/98   07/16/21 (!) 180/97           Pt admitted with followings belongings:        Patient's home medications were verified. Patient oriented to surroundings and program expectations and copy of patient rights given. Received admission packet:  yes. Consents reviewed, signed signed all but voluntary. Refused yes-voluntary. Patient verbalize understanding:  yes. Patient education on precautions: yes  Patient admitted to unit from [facility]. Patient admitted for suicidal ideations. Patient sent family members texts stating he wanted to end it all while intoxicated from alcohol. Patient reports broke up with his girlfriend of 1 year. Patient reports upon admit that he is not suicidal since sobering up. Patient was searched and wand used. Patient oriented to unit and assigned room. Food/fluids offered. Safety maintained. Dr. Ruth Weinberg notified by perfect serve of admission.                     Demetra Frederick RN

## 2021-07-16 NOTE — ED NOTES
Pt states that he does not have any suicidal thoughts at this time and that there were no bullets in the gun. This writer explained that pt has guns in the house.  Pt states that he had his mother go take all the fire arms out of the house     Dena Andres, [place]  07/16/21 2114

## 2021-07-16 NOTE — ED NOTES
Called [facility] for Psych transfer to [facility] [place].  Connected to [address] at [facility], call then transferred to Dr Candida Heath  07/16/21 9334

## 2021-07-16 NOTE — GROUP NOTE
Group Therapy Note    Date: 7/16/2021    Group Start Time: 1100  Group End Time: 6788  Group Topic: Cognitive Skills    PONCE Centeno , CTRS    Pt did not attend 1100 cognitive skills group d/t resting in room despite staff invitation to attend. 1:1 talk time offered as alternative to group session, pt declined.           Signature:  Nimo Youngblood

## 2021-07-17 VITALS
RESPIRATION RATE: 14 BRPM | DIASTOLIC BLOOD PRESSURE: 72 MMHG | WEIGHT: 140 LBS | OXYGEN SATURATION: 99 % | BODY MASS INDEX: 20.73 KG/M2 | TEMPERATURE: 98.3 F | HEART RATE: 59 BPM | HEIGHT: 69 IN | SYSTOLIC BLOOD PRESSURE: 133 MMHG

## 2021-07-17 PROCEDURE — 6370000000 HC RX 637 (ALT 250 FOR IP): Performed by: NURSE PRACTITIONER

## 2021-07-17 PROCEDURE — 99239 HOSP IP/OBS DSCHRG MGMT >30: CPT | Performed by: PSYCHIATRY & NEUROLOGY

## 2021-07-17 RX ADMIN — METOPROLOL SUCCINATE 50 MG: 50 TABLET, EXTENDED RELEASE ORAL at 08:39

## 2021-07-17 RX ADMIN — Medication 1 TABLET: at 08:38

## 2021-07-17 RX ADMIN — THIAMINE HCL TAB 100 MG 100 MG: 100 TAB at 08:38

## 2021-07-17 NOTE — BH NOTE
Patient had a CIWA scale of 1. Patient denied having any withdrawal symptoms and he stated that he was feeling fine. Will continue to monitor for status change.

## 2021-07-17 NOTE — SUICIDE SAFETY PLAN
SAFETY PLAN    A suicide Safety Plan is a document that supports someone when they are having thoughts of suicide. Warning Signs that indicate a suicidal crisis may be developing: What (situations, thoughts, feelings, body sensations, behaviors, etc.) do you experience that lets you know you are beginning to think about suicide? 1. Rare thoughts  2. sweaty  3. Hateful thoughts    Internal Coping Strategies:  What things can I do (relaxation techniques, hobbies, physical activities, etc.) to take my mind off my problems without contacting another person? 1. Go for a walk  2. Play sports  3. work    People and social settings that provide distraction: Who can I call or where can I go to distract me? 1. Name: mom  Phone: [phone]  2. Name: dad  Phone: [phone]   3. Place: [facility]            4. Place:  meeting    People whom I can ask for help: Who can I call when I need help - for example, friends, family, clergy, someone else? 1. Name: mom                Phone: [phone]  2. Name: jill  Phone: [phone]  3. Name: Harpal Boggs  Phone: [phone]    Professionals or [address] [place], [place] [address]vd I can contact during a crisis: Who can I call for help - for example, my doctor, my psychiatrist, my psychologist, a mental health provider, a suicide hotline? 1. Clinician Name: Christian Smith   Phone: [phone]      Clinician Pager or Emergency Contact #:     2. Clinician Name: work line   Phone:       Clinician Pager or Emergency Contact #:     3. Suicide Prevention Lifeline: 2-451-039-TALK (3481)    4. [address] [place] [address] Emergency Services -  for example, [facility] suicide hotline, [facility] Hotline:       Emergency Services Address:       Emergency Services Phone:     Making the environment safe: How can I make my environment (house/apartment/living space) safer? For example, can I remove guns, medications, and other items? 1. Remove guns  2. Parent checking house

## 2021-07-17 NOTE — BH NOTE
[facility]  Discharge Note    Pt discharged with followings belongings:       Valuables sent home with patient or returned to patient. Patient education on aftercare instructions: done  Information faxed to to be faxed monday by  who makes appointment  at 12:01 PM .Patient verbalize understanding of AVS:  done.     Status EXAM upon discharge:  Status and Exam  Normal: No  Facial Expression: Flat  Affect: Appropriate, Blunt  Level of Consciousness: Alert  Mood:Normal: No  Mood: Anxious  Motor Activity:Normal: No  Motor Activity: Decreased  Interview Behavior: Cooperative, Evasive  Preception: [place] to Person, [place]lon Craw to Time, [place] to Place, [place] to Situation  Attention:Normal: No  Attention: Distractible  Thought Processes: Circumstantial  Thought Content:Normal: No  Thought Content: Preoccupations  Hallucinations: None  Delusions: No  Memory:Normal: Yes  Insight and Judgment: No  Insight and Judgment: Poor Insight  Present Suicidal Ideation: No  Present Homicidal Ideation: No      Metabolic Screening:    No results found for: LABA1C    No results found for: CHOL  No results found for: TRIG  No results found for: HDL  No components found for: LDLCAL  No results found for: Gene , RN     Patient discharged with family to private residence

## 2021-07-17 NOTE — GROUP NOTE
Group Therapy Note    Date: 7/17/2021    Group Start Time: 0845  Group End Time: 0915  Group Topic: Community Meeting    PONCE Lopez [place], [place]        Group Therapy Note    Attendees: 8/20            Patient's Goal:  To increase social interaction and to explore and identify short term goals r/t wellness and recovery. RT discussed group schedule and unit structure/resources and encouraged pts to be engaged in their treatment. Pts were given opportunities to ask questions. Notes: Pt participated in group task and was able to identify short term goals r/t wellness and recovery. Pt is pleasant and supportive of peers        Status After Intervention:  Improved     Participation Level:  Active Listener and Interactive     Participation Quality: Appropriate, Attentive, Sharing         Speech:   Normal     Thought Process/Content: Logical,linear     Affective Functioning: Blunted     Mood: euthymic        Level of consciousness:  Alert, and Attentive        Response to Learning: Able to verbalize current knowledge/experience, Able to verbalize/acknowledge new learning, and Progressing to goal        Endings: None Reported     Modes of Intervention: Education, Support, Socialization, Exploration, Clarifying and Problem-solving        Discipline Responsible: Psychoeducational Specialist        Signature:  Karie Thomas

## 2021-07-17 NOTE — PLAN OF CARE
Problem: Suicide risk  Goal: Provide patient with safe environment  Description: Provide patient with safe environment  7/16/2021 2340 by Sole Wells RN  Outcome: Ongoing  Patient provided with a safe environment. Patient stated he felt safe on the unit. Patient monitored every 15 minutes and as needed for safety checks. Problem: Depressive Behavior With or Without Suicide Precautions:  Goal: Able to verbalize and/or display a decrease in depressive symptoms  Description: Able to verbalize and/or display a decrease in depressive symptoms  7/16/2021 2340 by Sole Wells RN  Outcome: Ongoing  Patient denied depressive symptoms. Patient had a flat affect and was isolative to his room and self. Patient was friendly and social with staff and was compliant with treatment. Patient states he is ready for discharge. Goal: Able to verbalize support systems  Description: Able to verbalize support systems  7/16/2021 2340 by Sole Wells RN  Outcome: Ongoing  Patient did not talk about support systems. Will continue to monitor. Goal: Absence of self-harm  Description: Absence of self-harm  7/16/2021 2340 by Sole Wells RN  Outcome: Ongoing  Patient absent of self-harm. Patient denied wanting to harm self. Patient observed every 15 minutes and as needed for safety and environmental factors. Problem: Tobacco Use:  Goal: Inpatient tobacco use cessation counseling participation  Description: Inpatient tobacco use cessation counseling participation  7/16/2021 2340 by Sole Wells RN  Outcome: Ongoing  Patient refused tobacco use cessation counseling.

## 2021-07-17 NOTE — BH NOTE
Patient given tobacco quitline number 38906659490 at this time, refusing to call at this time, states \" I just dont want to quit now\"- patient given information as to the dangers of long term tobacco use. Continue to reinforce the importance of tobacco cessation.

## 2021-07-17 NOTE — DISCHARGE SUMMARY
Provider Discharge Summary     Patient ID:  Jamie Johnson  [id number]  29 y.o.  1985    Admit date: 7/16/2021    Discharge date and time: 7/17/2021  5:37 PM     Admitting Physician: Kaz Lacy MD     Discharge Physician: Natalie Hugo MD    Admission Diagnoses: Depression with suicidal ideation [F32.9, R45.851]    Discharge Diagnoses:      Depression with suicidal ideation     Patient Active Problem List   Diagnosis Code    Depression with suicidal ideation F32.9, R45.851    Alcohol abuse F10.10        Admission Condition: poor    Discharged Condition: stable    Indication for Admission: threat to self    History of Present Illnes (present tense wording is of findings from admission exam and are not necessarily indicative of current findings):   Yvrose Estrada is a 29 y.o. male who has a past medical history of hypertension and aortic valve regurgitation. Patient presented to the ED via [employer] after he contacted family members stating he was going to end his life. ED note:  Darnell Rivascooper a29 y. o. male who presents to the emergency department after having been brought in by the police.  Apparently he got really drunk due to relationship issues and texted a bunch of family and friends that he is going to end it today.  Family went to his house and removed firearm from there such that when police came to the house they found him intoxicated, sleeping. [facility] is awake now states that he is not suicidal however the family reports that he <texted otherwise>. [facility] does have a history of hypertension and depression.  He apparently has talked in the past about suicide. [facility] reports to me that he is not suicidal or homicidal at this time. Maura Potter is apparent reports that he drank 20 beers today.     Mr. Estrada was seen for initial evaluation today. Nursing staff report patient has been cooperative yet anxious today and his interactions with peers and staff have been appropriate.   He has been keeping mostly to himself and pacing around the unit. He did eat lunch and dinner today and is able to make his needs known. He was observed pacing slowly around the unit and anxious, but was agreeable to conversation on approach and sat with writer in the privacy of his room. He was very adamant that he did not need to be here and stated \"look, I am not suicidal, I had way too much to drink, I love myself way too much to hurt myself and I really do not need to be here; I just want to go home and sleep in my own bed\". He is denying any current symptoms of depression. He is also denying suicidal ideation and stated he obviously said and did something stupid when he was intoxicated and that he realizes he needs to drink a lot less. He endorses recent interpersonal conflict with a former girlfriend, but states he has had no thoughts of wanting to harm himself or anybody else when sober. He endorses some symptoms of depression \"several years ago\" as a young adult and was put on an antidepressant, which he could not recall the name of and reports taking it for only a few weeks before he quit. Patient reports daily alcohol intake between 4-8 beers and feels that he does not have a drinking problem. He reports being able to quit anytime and denies ever experiencing withdrawal symptoms after stopping for several days. He denies any illicit substance abuse and reports smoking 1/2 to 1 pack of cigarettes a day. He is also denying homicidal ideation, frequent anxiety, panic attacks, lana or hypomania, phobias, obsessions and compulsions, body and vocal tics, hallucinations, delusions, paranoia, history of trauma, or symptoms of PTSD.      Patient stated he has a current prescription for metoprolol XL 50 mg by mouth daily. He stated he quit taking it 2 days ago because he did not like the way it made him feel and he did not feel that he needed it.   Patient was advised against quitting beta-blocker \"cold turkey\" and was encouraged to continue taking it until he can follow-up with his family physician to avoid rebound hypertension or cardiac event. Patient is not open to starting an antidepressant at this time. Patient agrees to contract for safety on the unit. Hospital Course:   Upon admission, Trell Estrada was provided a safe secure environment, introduced to unit milieu. Patient participated in groups and individual therapies. Meds were adjusted as noted below. After few days of hospital care, patient began to feel improvement. Depression lifted, thoughts to harm self ceased. Sleep improved, appetite was good. On morning rounds 7/17/2021, Trell Estrada  endorses feeling ready for discharge. Patient denies suicidal or homicidal ideations, denies hallucinations or delusions. Denies SE's from meds. It was decided that maximum benefit from hospital care had been achieved and patient can be discharged. Consults:   No consults    Significant Diagnostic Studies: Routine labs and diagnostics    Treatments: Psychotropic medications, therapy with group, milieu, and 1:1 with nurses, social workers, PA-C/CNP, and Attending physician.       Discharge Medications:  Discharge Medication List as of 7/17/2021 10:23 AM      START taking these medications    Details   traZODone (DESYREL) 50 MG tablet Take 1 tablet by mouth nightly as needed for Sleep, Disp-30 tablet, R-0Normal      Multiple Vitamins-Minerals (THERAPEUTIC MULTIVITAMIN-MINERALS) tablet Take 1 tablet by mouth daily, Disp-30 tablet, R-0Normal      thiamine mononitrate (THIAMINE) 100 MG tablet Take 1 tablet by mouth daily, Disp-30 tablet, R-0Normal         CONTINUE these medications which have CHANGED    Details   metoprolol succinate (TOPROL XL) 50 MG extended release tablet Take 1 tablet by mouth daily, Disp-30 tablet, R-3Normal         CONTINUE these medications which have NOT CHANGED    Details   ibuprofen (ADVIL;MOTRIN) 200 MG tablet Take 800 mg by mouth every 8 hours as needed for Pain (takes daily for headache)Historical Med                  Core Measures statement:   Not applicable    Discharge Exam:  Level of consciousness:  Within normal limits  Appearance: Street clothes, seated, with good grooming  Behavior/Motor: No abnormalities noted  Attitude toward examiner:  Cooperative, attentive, good eye contact  Speech:  spontaneous, normal rate, normal volume and well articulated  Mood:  euthymic  Affect:  Full range  Thought processes:  linear, goal directed and coherent  Thought content:  denies homicidal ideation  Suicidal Ideation:  denies suicidal ideation  Delusions:  no evidence of delusions  Perceptual Disturbance:  denies any perceptual disturbance  Cognition:  Intact  Memory: age appropriate  Insight & Judgement: fair  Medication side effects: denies     Disposition: home    Patient Instructions: Activity: activity as tolerated  1. Patient instructed to take medications regularly and follow up with outpatient appointments. Follow-up as scheduled with outpatient [facility]      Signed:    Electronically signed by Adeline Thomas MD on 7/17/21 at 5:37 PM EDT    Time Spent on discharge is more than 30 minutes in the examination, evaluation, counseling and review of medications and discharge plan.

## 2021-07-21 NOTE — CARE COORDINATION
Name: Andie Estrada    : 1985    Discharge Date: 21    Primary Auth/Cert #: [id number]    Destination- home     Discharge Medications:      Medication List      START taking these medications    therapeutic multivitamin-minerals tablet  Take 1 tablet by mouth daily  Notes to patient: Supplement     traZODone 50 MG tablet  Commonly known as: DESYREL  Take 1 tablet by mouth nightly as needed for Sleep  Notes to patient: sleep     vitamin B-1 100 MG tablet  Commonly known as: THIAMINE  Take 1 tablet by mouth daily  Notes to patient: supplement        CONTINUE taking these medications    ibuprofen 200 MG tablet  Commonly known as: ADVIL;MOTRIN     metoprolol succinate 50 MG extended release tablet  Commonly known as: TOPROL XL  Take 1 tablet by mouth daily           Where to Get Your Medications      These medications were sent to [facility]/pharmacy #[id number]- [place], [place] - [address] [phone] Christiane Dove [phone]  [facility] [place] [address]    Phone: [phone]   · metoprolol succinate 50 MG extended release tablet  · therapeutic multivitamin-minerals tablet  · traZODone 50 MG tablet  · vitamin B-1 100 MG tablet         Follow Up Appointment: No follow-up provider specified.

## 2022-06-17 ENCOUNTER — HOSPITAL ENCOUNTER (EMERGENCY)
Age: 37
Discharge: ANOTHER HOSPITAL | End: 2022-06-18
Attending: STUDENT IN AN ORGANIZED HEALTH CARE EDUCATION/TRAINING PROGRAM
Payer: COMMERCIAL

## 2022-06-17 ENCOUNTER — APPOINTMENT (OUTPATIENT)
Dept: GENERAL RADIOLOGY | Age: 37
End: 2022-06-17
Payer: COMMERCIAL

## 2022-06-17 ENCOUNTER — APPOINTMENT (OUTPATIENT)
Dept: CT IMAGING | Age: 37
End: 2022-06-17
Payer: COMMERCIAL

## 2022-06-17 DIAGNOSIS — R41.82 ALTERED MENTAL STATUS, UNSPECIFIED ALTERED MENTAL STATUS TYPE: ICD-10-CM

## 2022-06-17 DIAGNOSIS — J96.01 ACUTE RESPIRATORY FAILURE WITH HYPOXIA (HCC): Primary | ICD-10-CM

## 2022-06-17 LAB
-: NORMAL
ABSOLUTE EOS #: 0.1 K/UL (ref 0–0.44)
ABSOLUTE IMMATURE GRANULOCYTE: <0.03 K/UL (ref 0–0.3)
ABSOLUTE LYMPH #: 2.53 K/UL (ref 1.1–3.7)
ABSOLUTE MONO #: 0.65 K/UL (ref 0.1–1.2)
ACETAMINOPHEN LEVEL: <5 UG/ML (ref 10–30)
ALBUMIN SERPL-MCNC: 4.9 G/DL (ref 3.5–5.2)
ALBUMIN/GLOBULIN RATIO: 2.7 (ref 1–2.5)
ALLEN TEST: ABNORMAL
ALP BLD-CCNC: 52 U/L (ref 40–129)
ALT SERPL-CCNC: 23 U/L (ref 5–41)
AMPHETAMINE SCREEN URINE: NEGATIVE
ANION GAP SERPL CALCULATED.3IONS-SCNC: 17 MMOL/L (ref 9–17)
AST SERPL-CCNC: 28 U/L
BARBITURATE SCREEN URINE: NEGATIVE
BASOPHILS # BLD: 1 % (ref 0–2)
BASOPHILS ABSOLUTE: 0.07 K/UL (ref 0–0.2)
BENZODIAZEPINE SCREEN, URINE: NEGATIVE
BILIRUB SERPL-MCNC: 0.3 MG/DL (ref 0.3–1.2)
BILIRUBIN DIRECT: <0.08 MG/DL
BILIRUBIN URINE: NEGATIVE
BILIRUBIN, INDIRECT: ABNORMAL MG/DL (ref 0–1)
BUN BLDV-MCNC: 10 MG/DL (ref 6–20)
BUN/CREAT BLD: 12 (ref 9–20)
BUPRENORPHINE URINE: NEGATIVE
CALCIUM SERPL-MCNC: 8.4 MG/DL (ref 8.6–10.4)
CANNABINOID SCREEN URINE: NEGATIVE
CHLORIDE BLD-SCNC: 101 MMOL/L (ref 98–107)
CO2: 21 MMOL/L (ref 20–31)
COCAINE METABOLITE, URINE: NEGATIVE
COLOR: YELLOW
CREAT SERPL-MCNC: 0.84 MG/DL (ref 0.7–1.2)
EOSINOPHILS RELATIVE PERCENT: 1 % (ref 1–4)
EPITHELIAL CELLS UA: NORMAL /HPF (ref 0–5)
ETHANOL PERCENT: 0.19 %
ETHANOL: 194 MG/DL
FIO2: 60
GFR AFRICAN AMERICAN: >60 ML/MIN
GFR NON-AFRICAN AMERICAN: >60 ML/MIN
GFR SERPL CREATININE-BSD FRML MDRD: ABNORMAL ML/MIN/{1.73_M2}
GFR SERPL CREATININE-BSD FRML MDRD: ABNORMAL ML/MIN/{1.73_M2}
GLUCOSE BLD-MCNC: 107 MG/DL (ref 70–99)
GLUCOSE BLD-MCNC: 87 MG/DL (ref 74–100)
GLUCOSE URINE: NEGATIVE
HCO3 ARTERIAL: 22.8 MMOL/L (ref 22–26)
HCT VFR BLD CALC: 42.7 % (ref 40.7–50.3)
HEMOGLOBIN: 14.4 G/DL (ref 13–17)
IMMATURE GRANULOCYTES: 0 %
KETONES, URINE: NEGATIVE
LEUKOCYTE ESTERASE, URINE: NEGATIVE
LYMPHOCYTES # BLD: 28 % (ref 24–43)
MAGNESIUM: 1.9 MG/DL (ref 1.6–2.6)
MCH RBC QN AUTO: 31.9 PG (ref 25.2–33.5)
MCHC RBC AUTO-ENTMCNC: 33.7 G/DL (ref 28.4–34.8)
MCV RBC AUTO: 94.5 FL (ref 82.6–102.9)
METHADONE SCREEN, URINE: NEGATIVE
METHAMPHETAMINE, URINE: NEGATIVE
MONOCYTES # BLD: 7 % (ref 3–12)
NEGATIVE BASE EXCESS, ART: 2.1 MMOL/L (ref 0–2)
NITRITE, URINE: NEGATIVE
NRBC AUTOMATED: 0 PER 100 WBC
O2 SAT, ARTERIAL: 99.7 % (ref 95–98)
OPIATES, URINE: NEGATIVE
OXYCODONE SCREEN URINE: NEGATIVE
PATIENT TEMP: 37
PCO2 ARTERIAL: 39.7 MMHG (ref 35–45)
PDW BLD-RTO: 12.6 % (ref 11.8–14.4)
PEEP/CPAP: 8
PH ARTERIAL: 7.38 (ref 7.35–7.45)
PH UA: 7 (ref 5–9)
PHENCYCLIDINE, URINE: NEGATIVE
PLATELET # BLD: 173 K/UL (ref 138–453)
PMV BLD AUTO: 10 FL (ref 8.1–13.5)
PO2 ARTERIAL: 286.2 MMHG (ref 80–100)
POTASSIUM SERPL-SCNC: 3.4 MMOL/L (ref 3.7–5.3)
PROPOXYPHENE, URINE: NEGATIVE
PROTEIN UA: NEGATIVE
PT. POSITION: ABNORMAL
RBC # BLD: 4.52 M/UL (ref 4.21–5.77)
RBC UA: NORMAL /HPF (ref 0–2)
RESPIRATORY RATE: 18
SALICYLATE LEVEL: <1 MG/DL (ref 3–10)
SAMPLE SITE: ABNORMAL
SEG NEUTROPHILS: 63 % (ref 36–65)
SEGMENTED NEUTROPHILS ABSOLUTE COUNT: 5.57 K/UL (ref 1.5–8.1)
SET RATE: 18
SODIUM BLD-SCNC: 139 MMOL/L (ref 135–144)
SPECIFIC GRAVITY UA: <1.005 (ref 1.01–1.02)
TOTAL PROTEIN: 6.7 G/DL (ref 6.4–8.3)
TRICYCLIC ANTIDEPRESSANTS, UR: NEGATIVE
TROPONIN, HIGH SENSITIVITY: <6 NG/L (ref 0–22)
TURBIDITY: CLEAR
URINE HGB: NEGATIVE
UROBILINOGEN, URINE: NORMAL
VT: 410
WBC # BLD: 8.9 K/UL (ref 3.5–11.3)
WBC UA: NORMAL /HPF (ref 0–5)

## 2022-06-17 PROCEDURE — 6360000002 HC RX W HCPCS: Performed by: STUDENT IN AN ORGANIZED HEALTH CARE EDUCATION/TRAINING PROGRAM

## 2022-06-17 PROCEDURE — 2500000003 HC RX 250 WO HCPCS: Performed by: STUDENT IN AN ORGANIZED HEALTH CARE EDUCATION/TRAINING PROGRAM

## 2022-06-17 PROCEDURE — 94002 VENT MGMT INPAT INIT DAY: CPT

## 2022-06-17 PROCEDURE — 82805 BLOOD GASES W/O2 SATURATION: CPT

## 2022-06-17 PROCEDURE — 71045 X-RAY EXAM CHEST 1 VIEW: CPT

## 2022-06-17 PROCEDURE — G0480 DRUG TEST DEF 1-7 CLASSES: HCPCS

## 2022-06-17 PROCEDURE — 80048 BASIC METABOLIC PNL TOTAL CA: CPT

## 2022-06-17 PROCEDURE — 80306 DRUG TEST PRSMV INSTRMNT: CPT

## 2022-06-17 PROCEDURE — 31500 INSERT EMERGENCY AIRWAY: CPT

## 2022-06-17 PROCEDURE — 2580000003 HC RX 258: Performed by: STUDENT IN AN ORGANIZED HEALTH CARE EDUCATION/TRAINING PROGRAM

## 2022-06-17 PROCEDURE — 82947 ASSAY GLUCOSE BLOOD QUANT: CPT

## 2022-06-17 PROCEDURE — 96376 TX/PRO/DX INJ SAME DRUG ADON: CPT

## 2022-06-17 PROCEDURE — 80179 DRUG ASSAY SALICYLATE: CPT

## 2022-06-17 PROCEDURE — 36600 WITHDRAWAL OF ARTERIAL BLOOD: CPT

## 2022-06-17 PROCEDURE — 96375 TX/PRO/DX INJ NEW DRUG ADDON: CPT

## 2022-06-17 PROCEDURE — 96365 THER/PROPH/DIAG IV INF INIT: CPT

## 2022-06-17 PROCEDURE — 85025 COMPLETE CBC W/AUTO DIFF WBC: CPT

## 2022-06-17 PROCEDURE — 36415 COLL VENOUS BLD VENIPUNCTURE: CPT

## 2022-06-17 PROCEDURE — 99285 EMERGENCY DEPT VISIT HI MDM: CPT

## 2022-06-17 PROCEDURE — 84484 ASSAY OF TROPONIN QUANT: CPT

## 2022-06-17 PROCEDURE — 83735 ASSAY OF MAGNESIUM: CPT

## 2022-06-17 PROCEDURE — 6360000002 HC RX W HCPCS

## 2022-06-17 PROCEDURE — 80143 DRUG ASSAY ACETAMINOPHEN: CPT

## 2022-06-17 PROCEDURE — 70450 CT HEAD/BRAIN W/O DYE: CPT

## 2022-06-17 PROCEDURE — 81001 URINALYSIS AUTO W/SCOPE: CPT

## 2022-06-17 PROCEDURE — 80076 HEPATIC FUNCTION PANEL: CPT

## 2022-06-17 RX ORDER — PROPOFOL 10 MG/ML
INJECTION, EMULSION INTRAVENOUS
Status: DISCONTINUED
Start: 2022-06-17 | End: 2022-06-18 | Stop reason: HOSPADM

## 2022-06-17 RX ORDER — MIDAZOLAM HYDROCHLORIDE 2 MG/2ML
2 INJECTION, SOLUTION INTRAMUSCULAR; INTRAVENOUS
Status: DISCONTINUED | OUTPATIENT
Start: 2022-06-17 | End: 2022-06-18 | Stop reason: HOSPADM

## 2022-06-17 RX ORDER — MIDAZOLAM HYDROCHLORIDE 2 MG/2ML
4 INJECTION, SOLUTION INTRAMUSCULAR; INTRAVENOUS ONCE
Status: DISCONTINUED | OUTPATIENT
Start: 2022-06-17 | End: 2022-06-18 | Stop reason: HOSPADM

## 2022-06-17 RX ADMIN — ETOMIDATE 20 MG: 2 INJECTION INTRAVENOUS at 21:41

## 2022-06-17 RX ADMIN — NALOXONE HYDROCHLORIDE 2 MG: 0.4 INJECTION, SOLUTION INTRAMUSCULAR; INTRAVENOUS; SUBCUTANEOUS at 21:40

## 2022-06-17 RX ADMIN — PROPOFOL 1000 MG: 10 INJECTION, EMULSION INTRAVENOUS at 21:58

## 2022-06-17 RX ADMIN — FENTANYL CITRATE 25 MCG/HR: 50 INJECTION, SOLUTION INTRAMUSCULAR; INTRAVENOUS at 23:01

## 2022-06-17 RX ADMIN — ROCURONIUM BROMIDE 100 MG: 10 INJECTION INTRAVENOUS at 21:41

## 2022-06-17 RX ADMIN — MIDAZOLAM HYDROCHLORIDE 4 MG: 1 INJECTION, SOLUTION INTRAMUSCULAR; INTRAVENOUS at 22:32

## 2022-06-17 ASSESSMENT — PULMONARY FUNCTION TESTS: PIF_VALUE: 17

## 2022-06-18 ENCOUNTER — APPOINTMENT (OUTPATIENT)
Dept: GENERAL RADIOLOGY | Age: 37
DRG: 208 | End: 2022-06-18
Payer: COMMERCIAL

## 2022-06-18 ENCOUNTER — APPOINTMENT (OUTPATIENT)
Dept: CT IMAGING | Age: 37
DRG: 208 | End: 2022-06-18
Payer: COMMERCIAL

## 2022-06-18 ENCOUNTER — HOSPITAL ENCOUNTER (INPATIENT)
Age: 37
LOS: 1 days | Discharge: HOME | DRG: 208 | End: 2022-06-19
Attending: EMERGENCY MEDICINE | Admitting: INTERNAL MEDICINE
Payer: COMMERCIAL

## 2022-06-18 VITALS
WEIGHT: 150 LBS | RESPIRATION RATE: 19 BRPM | BODY MASS INDEX: 22.22 KG/M2 | HEART RATE: 80 BPM | HEIGHT: 69 IN | SYSTOLIC BLOOD PRESSURE: 161 MMHG | OXYGEN SATURATION: 100 % | DIASTOLIC BLOOD PRESSURE: 97 MMHG

## 2022-06-18 DIAGNOSIS — J96.00 ACUTE RESPIRATORY FAILURE, UNSPECIFIED WHETHER WITH HYPOXIA OR HYPERCAPNIA (HCC): Primary | ICD-10-CM

## 2022-06-18 PROBLEM — G93.40 ENCEPHALOPATHY ACUTE: Status: ACTIVE | Noted: 2022-06-18

## 2022-06-18 PROBLEM — R06.03 RESPIRATORY DISTRESS: Status: ACTIVE | Noted: 2022-06-18

## 2022-06-18 LAB
ABSOLUTE EOS #: 0.07 K/UL (ref 0–0.44)
ABSOLUTE IMMATURE GRANULOCYTE: <0.03 K/UL (ref 0–0.3)
ABSOLUTE LYMPH #: 2.17 K/UL (ref 1.1–3.7)
ABSOLUTE MONO #: 0.39 K/UL (ref 0.1–1.2)
ACETAMINOPHEN LEVEL: <5 UG/ML (ref 10–30)
ALBUMIN SERPL-MCNC: 4.1 G/DL (ref 3.5–5.2)
ALBUMIN/GLOBULIN RATIO: 2.4 (ref 1–2.5)
ALP BLD-CCNC: 45 U/L (ref 40–129)
ALT SERPL-CCNC: 18 U/L (ref 5–41)
ANION GAP SERPL CALCULATED.3IONS-SCNC: 16 MMOL/L (ref 9–17)
ANION GAP: 13 MMOL/L (ref 7–16)
ANION GAP: 9 MMOL/L (ref 7–16)
AST SERPL-CCNC: 22 U/L
BASOPHILS # BLD: 1 % (ref 0–2)
BASOPHILS ABSOLUTE: 0.06 K/UL (ref 0–0.2)
BILIRUB SERPL-MCNC: 0.23 MG/DL (ref 0.3–1.2)
BUN BLDV-MCNC: 10 MG/DL (ref 6–20)
CALCIUM SERPL-MCNC: 8.1 MG/DL (ref 8.6–10.4)
CHLORIDE BLD-SCNC: 101 MMOL/L (ref 98–107)
CHP ED QC CHECK: YES
CO2: 20 MMOL/L (ref 20–31)
CREAT SERPL-MCNC: 0.97 MG/DL (ref 0.7–1.2)
EKG ATRIAL RATE: 61 BPM
EKG P AXIS: 79 DEGREES
EKG P-R INTERVAL: 152 MS
EKG Q-T INTERVAL: 506 MS
EKG QRS DURATION: 94 MS
EKG QTC CALCULATION (BAZETT): 509 MS
EKG R AXIS: 87 DEGREES
EKG T AXIS: 72 DEGREES
EKG VENTRICULAR RATE: 61 BPM
EOSINOPHILS RELATIVE PERCENT: 1 % (ref 1–4)
ETHANOL PERCENT: 0.15 %
ETHANOL: 148 MG/DL
FIO2: 30
GFR AFRICAN AMERICAN: >60 ML/MIN
GFR NON-AFRICAN AMERICAN: >60 ML/MIN
GFR SERPL CREATININE-BSD FRML MDRD: >60 ML/MIN
GFR SERPL CREATININE-BSD FRML MDRD: >60 ML/MIN
GFR SERPL CREATININE-BSD FRML MDRD: ABNORMAL ML/MIN/{1.73_M2}
GFR SERPL CREATININE-BSD FRML MDRD: ABNORMAL ML/MIN/{1.73_M2}
GFR SERPL CREATININE-BSD FRML MDRD: NORMAL ML/MIN/{1.73_M2}
GLUCOSE BLD-MCNC: 87 MG/DL (ref 75–110)
GLUCOSE BLD-MCNC: 90 MG/DL (ref 74–100)
GLUCOSE BLD-MCNC: 94 MG/DL
GLUCOSE BLD-MCNC: 94 MG/DL (ref 74–100)
GLUCOSE BLD-MCNC: 97 MG/DL (ref 70–99)
HCO3 VENOUS: 26.8 MMOL/L (ref 22–29)
HCT VFR BLD CALC: 39.2 % (ref 40.7–50.3)
HEMOGLOBIN: 13.3 G/DL (ref 13–17)
IMMATURE GRANULOCYTES: 0 %
LYMPHOCYTES # BLD: 31 % (ref 24–43)
MAGNESIUM: 1.9 MG/DL (ref 1.6–2.6)
MCH RBC QN AUTO: 32 PG (ref 25.2–33.5)
MCHC RBC AUTO-ENTMCNC: 33.9 G/DL (ref 28.4–34.8)
MCV RBC AUTO: 94.2 FL (ref 82.6–102.9)
MODE: ABNORMAL
MONOCYTES # BLD: 6 % (ref 3–12)
MRSA, DNA, NASAL: NEGATIVE
NEGATIVE BASE EXCESS, ART: 3 (ref 0–2)
NRBC AUTOMATED: 0 PER 100 WBC
O2 DEVICE/FLOW/%: ABNORMAL
O2 SAT, VEN: 59 % (ref 60–85)
PCO2, VEN: 50.9 MM HG (ref 41–51)
PDW BLD-RTO: 12.7 % (ref 11.8–14.4)
PH VENOUS: 7.33 (ref 7.32–7.43)
PLATELET # BLD: 164 K/UL (ref 138–453)
PMV BLD AUTO: 10.3 FL (ref 8.1–13.5)
PO2, VEN: 33.3 MM HG (ref 30–50)
POC BUN: 9 MG/DL (ref 8–26)
POC BUN: 9 MG/DL (ref 8–26)
POC CHLORIDE: 108 MMOL/L (ref 98–107)
POC CHLORIDE: 110 MMOL/L (ref 98–107)
POC CREATININE: 0.87 MG/DL (ref 0.51–1.19)
POC CREATININE: 1.2 MG/DL (ref 0.51–1.19)
POC HCO3: 22 MMOL/L (ref 21–28)
POC HEMATOCRIT: 37 % (ref 41–53)
POC HEMATOCRIT: 41 % (ref 41–53)
POC HEMOGLOBIN: 12.7 G/DL (ref 13.5–17.5)
POC HEMOGLOBIN: 14 G/DL (ref 13.5–17.5)
POC IONIZED CALCIUM: 1.07 MMOL/L (ref 1.15–1.33)
POC IONIZED CALCIUM: 1.13 MMOL/L (ref 1.15–1.33)
POC LACTIC ACID: 2.15 MMOL/L (ref 0.56–1.39)
POC LACTIC ACID: 2.16 MMOL/L (ref 0.56–1.39)
POC O2 SATURATION: 99 % (ref 94–98)
POC PCO2: 39.6 MM HG (ref 35–48)
POC PH: 7.35 (ref 7.35–7.45)
POC PO2: 131.9 MM HG (ref 83–108)
POC POTASSIUM: 3 MMOL/L (ref 3.5–4.5)
POC POTASSIUM: 3.3 MMOL/L (ref 3.5–4.5)
POC SODIUM: 143 MMOL/L (ref 138–146)
POC SODIUM: 144 MMOL/L (ref 138–146)
POC TCO2: 22 MMOL/L (ref 22–30)
POC TCO2: 27 MMOL/L (ref 22–30)
POSITIVE BASE EXCESS, VEN: 0 (ref 0–3)
POTASSIUM SERPL-SCNC: 3.2 MMOL/L (ref 3.7–5.3)
RBC # BLD: 4.16 M/UL (ref 4.21–5.77)
SALICYLATE LEVEL: <1 MG/DL (ref 3–10)
SAMPLE SITE: ABNORMAL
SARS-COV-2, RAPID: NOT DETECTED
SEG NEUTROPHILS: 61 % (ref 36–65)
SEGMENTED NEUTROPHILS ABSOLUTE COUNT: 4.25 K/UL (ref 1.5–8.1)
SODIUM BLD-SCNC: 137 MMOL/L (ref 135–144)
SPECIMEN DESCRIPTION: NORMAL
SPECIMEN DESCRIPTION: NORMAL
TOTAL PROTEIN: 5.8 G/DL (ref 6.4–8.3)
TOXIC TRICYCLIC SC,BLOOD: NEGATIVE
TROPONIN, HIGH SENSITIVITY: 10 NG/L (ref 0–22)
TROPONIN, HIGH SENSITIVITY: 7 NG/L (ref 0–22)
TSH SERPL DL<=0.05 MIU/L-ACNC: 2.1 UIU/ML (ref 0.3–5)
WBC # BLD: 7 K/UL (ref 3.5–11.3)

## 2022-06-18 PROCEDURE — 93005 ELECTROCARDIOGRAM TRACING: CPT | Performed by: EMERGENCY MEDICINE

## 2022-06-18 PROCEDURE — 80143 DRUG ASSAY ACETAMINOPHEN: CPT

## 2022-06-18 PROCEDURE — 2580000003 HC RX 258: Performed by: STUDENT IN AN ORGANIZED HEALTH CARE EDUCATION/TRAINING PROGRAM

## 2022-06-18 PROCEDURE — 82803 BLOOD GASES ANY COMBINATION: CPT

## 2022-06-18 PROCEDURE — 93010 ELECTROCARDIOGRAM REPORT: CPT | Performed by: INTERNAL MEDICINE

## 2022-06-18 PROCEDURE — 84443 ASSAY THYROID STIM HORMONE: CPT

## 2022-06-18 PROCEDURE — 6360000004 HC RX CONTRAST MEDICATION: Performed by: STUDENT IN AN ORGANIZED HEALTH CARE EDUCATION/TRAINING PROGRAM

## 2022-06-18 PROCEDURE — 80051 ELECTROLYTE PANEL: CPT

## 2022-06-18 PROCEDURE — 80053 COMPREHEN METABOLIC PANEL: CPT

## 2022-06-18 PROCEDURE — 94002 VENT MGMT INPAT INIT DAY: CPT

## 2022-06-18 PROCEDURE — 94761 N-INVAS EAR/PLS OXIMETRY MLT: CPT

## 2022-06-18 PROCEDURE — A4216 STERILE WATER/SALINE, 10 ML: HCPCS | Performed by: STUDENT IN AN ORGANIZED HEALTH CARE EDUCATION/TRAINING PROGRAM

## 2022-06-18 PROCEDURE — 2700000000 HC OXYGEN THERAPY PER DAY

## 2022-06-18 PROCEDURE — 2500000003 HC RX 250 WO HCPCS: Performed by: STUDENT IN AN ORGANIZED HEALTH CARE EDUCATION/TRAINING PROGRAM

## 2022-06-18 PROCEDURE — 82330 ASSAY OF CALCIUM: CPT

## 2022-06-18 PROCEDURE — 99285 EMERGENCY DEPT VISIT HI MDM: CPT

## 2022-06-18 PROCEDURE — 80307 DRUG TEST PRSMV CHEM ANLYZR: CPT

## 2022-06-18 PROCEDURE — 2000000000 HC ICU R&B

## 2022-06-18 PROCEDURE — 85025 COMPLETE CBC W/AUTO DIFF WBC: CPT

## 2022-06-18 PROCEDURE — 6360000002 HC RX W HCPCS: Performed by: STUDENT IN AN ORGANIZED HEALTH CARE EDUCATION/TRAINING PROGRAM

## 2022-06-18 PROCEDURE — 83735 ASSAY OF MAGNESIUM: CPT

## 2022-06-18 PROCEDURE — 5A1935Z RESPIRATORY VENTILATION, LESS THAN 24 CONSECUTIVE HOURS: ICD-10-PCS | Performed by: EMERGENCY MEDICINE

## 2022-06-18 PROCEDURE — 72125 CT NECK SPINE W/O DYE: CPT

## 2022-06-18 PROCEDURE — 36600 WITHDRAWAL OF ARTERIAL BLOOD: CPT

## 2022-06-18 PROCEDURE — 80179 DRUG ASSAY SALICYLATE: CPT

## 2022-06-18 PROCEDURE — 87635 SARS-COV-2 COVID-19 AMP PRB: CPT

## 2022-06-18 PROCEDURE — 6360000002 HC RX W HCPCS

## 2022-06-18 PROCEDURE — 85014 HEMATOCRIT: CPT

## 2022-06-18 PROCEDURE — 84520 ASSAY OF UREA NITROGEN: CPT

## 2022-06-18 PROCEDURE — 82947 ASSAY GLUCOSE BLOOD QUANT: CPT

## 2022-06-18 PROCEDURE — 99291 CRITICAL CARE FIRST HOUR: CPT | Performed by: INTERNAL MEDICINE

## 2022-06-18 PROCEDURE — G0480 DRUG TEST DEF 1-7 CLASSES: HCPCS

## 2022-06-18 PROCEDURE — 71260 CT THORAX DX C+: CPT

## 2022-06-18 PROCEDURE — 87641 MR-STAPH DNA AMP PROBE: CPT

## 2022-06-18 PROCEDURE — 87040 BLOOD CULTURE FOR BACTERIA: CPT

## 2022-06-18 PROCEDURE — 51798 US URINE CAPACITY MEASURE: CPT

## 2022-06-18 PROCEDURE — 96374 THER/PROPH/DIAG INJ IV PUSH: CPT

## 2022-06-18 PROCEDURE — 84484 ASSAY OF TROPONIN QUANT: CPT

## 2022-06-18 PROCEDURE — 71045 X-RAY EXAM CHEST 1 VIEW: CPT

## 2022-06-18 PROCEDURE — 83605 ASSAY OF LACTIC ACID: CPT

## 2022-06-18 PROCEDURE — 51702 INSERT TEMP BLADDER CATH: CPT

## 2022-06-18 PROCEDURE — 82565 ASSAY OF CREATININE: CPT

## 2022-06-18 RX ORDER — ETOMIDATE 2 MG/ML
INJECTION INTRAVENOUS DAILY PRN
Status: COMPLETED | OUTPATIENT
Start: 2022-06-17 | End: 2022-06-17

## 2022-06-18 RX ORDER — ACETAMINOPHEN 650 MG/1
650 SUPPOSITORY RECTAL EVERY 6 HOURS PRN
Status: DISCONTINUED | OUTPATIENT
Start: 2022-06-18 | End: 2022-06-19 | Stop reason: HOSPADM

## 2022-06-18 RX ORDER — PROPOFOL 10 MG/ML
INJECTION, EMULSION INTRAVENOUS
Status: COMPLETED
Start: 2022-06-18 | End: 2022-06-18

## 2022-06-18 RX ORDER — SODIUM CHLORIDE 0.9 % (FLUSH) 0.9 %
5-40 SYRINGE (ML) INJECTION EVERY 12 HOURS SCHEDULED
Status: DISCONTINUED | OUTPATIENT
Start: 2022-06-18 | End: 2022-06-19 | Stop reason: HOSPADM

## 2022-06-18 RX ORDER — ROCURONIUM BROMIDE 10 MG/ML
INJECTION, SOLUTION INTRAVENOUS DAILY PRN
Status: COMPLETED | OUTPATIENT
Start: 2022-06-17 | End: 2022-06-17

## 2022-06-18 RX ORDER — NALOXONE HYDROCHLORIDE 0.4 MG/ML
INJECTION, SOLUTION INTRAMUSCULAR; INTRAVENOUS; SUBCUTANEOUS DAILY PRN
Status: COMPLETED | OUTPATIENT
Start: 2022-06-17 | End: 2022-06-17

## 2022-06-18 RX ORDER — PROPOFOL 10 MG/ML
5-50 INJECTION, EMULSION INTRAVENOUS CONTINUOUS
Status: DISCONTINUED | OUTPATIENT
Start: 2022-06-18 | End: 2022-06-19

## 2022-06-18 RX ORDER — ETOMIDATE 2 MG/ML
INJECTION INTRAVENOUS DAILY PRN
Status: DISCONTINUED | OUTPATIENT
Start: 2022-06-17 | End: 2022-06-18 | Stop reason: HOSPADM

## 2022-06-18 RX ORDER — POTASSIUM CHLORIDE 7.45 MG/ML
10 INJECTION INTRAVENOUS PRN
Status: DISCONTINUED | OUTPATIENT
Start: 2022-06-18 | End: 2022-06-19 | Stop reason: HOSPADM

## 2022-06-18 RX ORDER — SODIUM CHLORIDE 0.9 % (FLUSH) 0.9 %
5-40 SYRINGE (ML) INJECTION PRN
Status: DISCONTINUED | OUTPATIENT
Start: 2022-06-18 | End: 2022-06-19 | Stop reason: HOSPADM

## 2022-06-18 RX ORDER — SODIUM CHLORIDE 9 MG/ML
INJECTION, SOLUTION INTRAVENOUS CONTINUOUS
Status: DISCONTINUED | OUTPATIENT
Start: 2022-06-18 | End: 2022-06-19 | Stop reason: HOSPADM

## 2022-06-18 RX ORDER — ONDANSETRON 4 MG/1
4 TABLET, ORALLY DISINTEGRATING ORAL EVERY 8 HOURS PRN
Status: DISCONTINUED | OUTPATIENT
Start: 2022-06-18 | End: 2022-06-19 | Stop reason: HOSPADM

## 2022-06-18 RX ORDER — ACETAMINOPHEN 325 MG/1
650 TABLET ORAL EVERY 6 HOURS PRN
Status: DISCONTINUED | OUTPATIENT
Start: 2022-06-18 | End: 2022-06-19 | Stop reason: HOSPADM

## 2022-06-18 RX ORDER — MAGNESIUM SULFATE 1 G/100ML
1000 INJECTION INTRAVENOUS ONCE
Status: COMPLETED | OUTPATIENT
Start: 2022-06-18 | End: 2022-06-18

## 2022-06-18 RX ORDER — FENTANYL CITRATE 50 UG/ML
100 INJECTION, SOLUTION INTRAMUSCULAR; INTRAVENOUS
Status: DISCONTINUED | OUTPATIENT
Start: 2022-06-18 | End: 2022-06-19

## 2022-06-18 RX ORDER — ONDANSETRON 2 MG/ML
4 INJECTION INTRAMUSCULAR; INTRAVENOUS EVERY 6 HOURS PRN
Status: DISCONTINUED | OUTPATIENT
Start: 2022-06-18 | End: 2022-06-19 | Stop reason: HOSPADM

## 2022-06-18 RX ORDER — POTASSIUM CHLORIDE 20 MEQ/1
40 TABLET, EXTENDED RELEASE ORAL PRN
Status: DISCONTINUED | OUTPATIENT
Start: 2022-06-18 | End: 2022-06-19 | Stop reason: HOSPADM

## 2022-06-18 RX ORDER — ENOXAPARIN SODIUM 100 MG/ML
40 INJECTION SUBCUTANEOUS DAILY
Status: DISCONTINUED | OUTPATIENT
Start: 2022-06-18 | End: 2022-06-19 | Stop reason: HOSPADM

## 2022-06-18 RX ORDER — 0.9 % SODIUM CHLORIDE 0.9 %
1000 INTRAVENOUS SOLUTION INTRAVENOUS ONCE
Status: COMPLETED | OUTPATIENT
Start: 2022-06-18 | End: 2022-06-18

## 2022-06-18 RX ORDER — NALOXONE HYDROCHLORIDE 0.4 MG/ML
INJECTION, SOLUTION INTRAMUSCULAR; INTRAVENOUS; SUBCUTANEOUS DAILY PRN
Status: DISCONTINUED | OUTPATIENT
Start: 2022-06-17 | End: 2022-06-18 | Stop reason: HOSPADM

## 2022-06-18 RX ORDER — POTASSIUM CHLORIDE 7.45 MG/ML
10 INJECTION INTRAVENOUS
Status: COMPLETED | OUTPATIENT
Start: 2022-06-18 | End: 2022-06-18

## 2022-06-18 RX ORDER — POLYETHYLENE GLYCOL 3350 17 G/17G
17 POWDER, FOR SOLUTION ORAL DAILY PRN
Status: DISCONTINUED | OUTPATIENT
Start: 2022-06-18 | End: 2022-06-19 | Stop reason: HOSPADM

## 2022-06-18 RX ORDER — SODIUM CHLORIDE 9 MG/ML
INJECTION, SOLUTION INTRAVENOUS PRN
Status: DISCONTINUED | OUTPATIENT
Start: 2022-06-18 | End: 2022-06-19 | Stop reason: HOSPADM

## 2022-06-18 RX ADMIN — PROPOFOL 45 MCG/KG/MIN: 10 INJECTION, EMULSION INTRAVENOUS at 09:20

## 2022-06-18 RX ADMIN — SODIUM CHLORIDE, PRESERVATIVE FREE 20 MG: 5 INJECTION INTRAVENOUS at 07:43

## 2022-06-18 RX ADMIN — PROPOFOL 50 MCG/KG/MIN: 10 INJECTION, EMULSION INTRAVENOUS at 00:45

## 2022-06-18 RX ADMIN — FENTANYL CITRATE 100 MCG: 50 INJECTION, SOLUTION INTRAMUSCULAR; INTRAVENOUS at 12:23

## 2022-06-18 RX ADMIN — SODIUM CHLORIDE, PRESERVATIVE FREE 20 MG: 5 INJECTION INTRAVENOUS at 20:00

## 2022-06-18 RX ADMIN — PROPOFOL 50 MCG/KG/MIN: 10 INJECTION, EMULSION INTRAVENOUS at 05:00

## 2022-06-18 RX ADMIN — SODIUM CHLORIDE, PRESERVATIVE FREE 10 ML: 5 INJECTION INTRAVENOUS at 12:23

## 2022-06-18 RX ADMIN — SODIUM CHLORIDE: 9 INJECTION, SOLUTION INTRAVENOUS at 15:23

## 2022-06-18 RX ADMIN — SODIUM CHLORIDE 1000 ML: 9 INJECTION, SOLUTION INTRAVENOUS at 00:53

## 2022-06-18 RX ADMIN — SODIUM CHLORIDE: 9 INJECTION, SOLUTION INTRAVENOUS at 06:13

## 2022-06-18 RX ADMIN — FENTANYL CITRATE 100 MCG: 50 INJECTION, SOLUTION INTRAMUSCULAR; INTRAVENOUS at 03:00

## 2022-06-18 RX ADMIN — MAGNESIUM SULFATE HEPTAHYDRATE 1000 MG: 1 INJECTION, SOLUTION INTRAVENOUS at 09:09

## 2022-06-18 RX ADMIN — IOPAMIDOL 75 ML: 755 INJECTION, SOLUTION INTRAVENOUS at 02:47

## 2022-06-18 RX ADMIN — SODIUM CHLORIDE: 9 INJECTION, SOLUTION INTRAVENOUS at 10:17

## 2022-06-18 RX ADMIN — SODIUM CHLORIDE, PRESERVATIVE FREE 10 ML: 5 INJECTION INTRAVENOUS at 07:42

## 2022-06-18 RX ADMIN — SODIUM CHLORIDE, PRESERVATIVE FREE 10 ML: 5 INJECTION INTRAVENOUS at 20:01

## 2022-06-18 RX ADMIN — POTASSIUM CHLORIDE 10 MEQ: 7.46 INJECTION, SOLUTION INTRAVENOUS at 12:33

## 2022-06-18 RX ADMIN — POTASSIUM CHLORIDE 10 MEQ: 7.46 INJECTION, SOLUTION INTRAVENOUS at 09:06

## 2022-06-18 RX ADMIN — FENTANYL CITRATE 100 MCG: 50 INJECTION, SOLUTION INTRAMUSCULAR; INTRAVENOUS at 04:58

## 2022-06-18 RX ADMIN — POTASSIUM CHLORIDE 10 MEQ: 7.46 INJECTION, SOLUTION INTRAVENOUS at 11:23

## 2022-06-18 RX ADMIN — THIAMINE HYDROCHLORIDE 100 MG: 100 INJECTION, SOLUTION INTRAMUSCULAR; INTRAVENOUS at 11:13

## 2022-06-18 RX ADMIN — POTASSIUM CHLORIDE 10 MEQ: 7.46 INJECTION, SOLUTION INTRAVENOUS at 10:13

## 2022-06-18 RX ADMIN — FOLIC ACID 1 MG: 5 INJECTION, SOLUTION INTRAMUSCULAR; INTRAVENOUS; SUBCUTANEOUS at 10:25

## 2022-06-18 RX ADMIN — ENOXAPARIN SODIUM 40 MG: 100 INJECTION SUBCUTANEOUS at 07:44

## 2022-06-18 RX ADMIN — SODIUM CHLORIDE, PRESERVATIVE FREE 20 MG: 5 INJECTION INTRAVENOUS at 01:33

## 2022-06-18 RX ADMIN — PROPOFOL 50 MCG/KG/MIN: 10 INJECTION, EMULSION INTRAVENOUS at 06:12

## 2022-06-18 RX ADMIN — SODIUM CHLORIDE: 9 INJECTION, SOLUTION INTRAVENOUS at 09:05

## 2022-06-18 ASSESSMENT — PULMONARY FUNCTION TESTS
PIF_VALUE: 13
PIF_VALUE: 17
PIF_VALUE: 19
PIF_VALUE: 21
PIF_VALUE: 18
PIF_VALUE: 18
PIF_VALUE: 13

## 2022-06-18 ASSESSMENT — PAIN SCALES - GENERAL
PAINLEVEL_OUTOF10: 0

## 2022-06-18 NOTE — PROGRESS NOTES
Order obtained for extubation. SpO2 of 100 on 30% FiO2. Patient extubated and placed on room air. Post extubation SpO2 is 100% with HR  71 bpm and RR 22 breaths/min. Patient had strong cough that was productive of clear  sputum. Extubation Well tolerated by patient. Nancy CROOKS RCP   3:16 PM

## 2022-06-18 NOTE — PLAN OF CARE
Problem: Respiratory - Adult  Goal: Achieves optimal ventilation and oxygenation  6/18/2022 1521 by Naty Alonzo RCP  Outcome: Completed

## 2022-06-18 NOTE — FLOWSHEET NOTE
SPIRITUAL CARE DEPARTMENT - [facility] 84  PROGRESS NOTE    Shift date: 6/18/2022  Shift day: Saturday   Shift # 2    Room # 3950/9316-59   Name: Edilma Estrada                Hinduism: Non-Protestant   Place of [facility]:       Referral: Routine Visit    Admit Date & Time: 6/18/2022 12:33 AM    Assessment:  Edilma Estrada is a 40 y.o. male in the hospital due to Respiratory distress. Upon entering the room writer observes pt is intubated and his mother and sisters are at bedside. Mother appears very anxious. Intervention:  Writer introduced himself to family and offered space to express feelings, needs, and concerns and provided a ministry presence.  offered prayer and mother was grateful to pray for pt. Outcome:  Upon concluding the visit family appeared grateful and expressed gratitude for the spiritual\emotional care provided.     Plan:  Chaplains will remain available to offer spiritual and emotional support as needed        06/18/22 1410   Encounter Summary   Service Provided For: Family   Referral/Consult From: [facility] Family members;Parent   Last Encounter  06/18/22   Complexity of Encounter Moderate   Begin Time 1358   End Time  1408   Total Time Calculated 10 min   Encounter    Type Initial Screen/Assessment   Assessment/Intervention/Outcome   Assessment Anxious   Intervention Active listening;Explored/Affirmed feelings, thoughts, concerns;Prayer (assurance of)/[place]   Outcome Expressed Gratitude         Electronically signed by Chaplain Resident Job Tompkins, [address] 6/18/2022 at 2:21 PM   [facility]  [phone]

## 2022-06-18 NOTE — ED NOTES
Called [facility] for transfer to [facility], connected to [facility] ER, transferred call to Dr Beto Boyle  06/17/22 6767

## 2022-06-18 NOTE — H&P
Attending Physician Statement  I have discussed the case of Yahir Estrada, including pertinent history and exam findings with the resident/fellow/medical student/NP/PA. I have seen and examined the patient and the key elements of the encounter have been performed by me. I agree with the assessment, plan and orders as documented by the resident/fellow/medical student/NP/PA  With changes made to the note as needed. Pt was seen during rounds. Review of Systems:   In addition to the pertinent positives and negatives as stated within HPI and the review of systems as documented in their notes, all other systems were reviewed when able to and are reported negative. Patient admitted with respiratory arrest following respiratory depression requiring intubation. Patient apparently has history of bad aortic valve and essential hypertension. Patient apparently was found unresponsive at home and did not respond to Narcan. Patient with known history of alcoholism and was found to have an alcohol level of 195 in the emergency room. Acute respiratory failure unspecified with hypoxemia or hypercarbia. Part of this could be due to atelectasis caused by alcohol abuse and hypoventilation and also a contribution from hypokalemia and being a smoker. Also concern was possibility of seizure-continue mechanical ventilator support, obtain chest x-ray and ABG as needed, antiseizure precautions, consult neurology if patient has any seizures since observed or if mentation does not improve    Hypokalemia-supplement potassium and continue to monitor    \"Bad aortic valve\".   Review of the echocardiogram from 2017 shows moderate to severe aortic regurgitation-obtain echocardiogram    Essential hypertension-monitor blood pressure and cover with medications    Allergy to bee venom-avoid    Current smoker-recommend cessation and use nicotine patch    Current alcohol use-watch for withdrawal, thiamine, folate and multivitamin to be given    History of depression-resume home medication    History of vasovagal syncope-observe    9 mm right upper lobe lung nodule-needs outpatient follow-up    Bibasilar atelectasis-continue observation as patient is on positive pressure ventilation    Patient is on Lovenox and Pepcid  Total critical care time caring for this patient with life threatening, unstable organ failure, including direct patient contact, management of life support systems, review of data including imaging and labs, discussions with other team members and physicians at least 27  Min so far today, excluding procedures.         Татьяна Lerma MD  6/18/2022  7:36 AM
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
## 2022-06-18 NOTE — PLAN OF CARE
Problem: Respiratory - Adult  Goal: Achieves optimal ventilation and oxygenation  6/18/2022 0943 by Walt Castro RCP  Outcome: Progressing
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
## 2022-06-18 NOTE — ED PROVIDER NOTES
[facility]     Emergency Department     Faculty Attestation    I performed a history and physical examination of the patient and discussed management with the resident. I have reviewed and agree with the residents findings including all diagnostic interpretations, and treatment plans as written. Any areas of disagreement are noted on the chart. I was personally present for the key portions of any procedures. I have documented in the chart those procedures where I was not present during the key portions. I have reviewed the emergency nurses triage note. I agree with the chief complaint, past medical history, past surgical history, allergies, medications, social and family history as documented unless otherwise noted below. Documentation of the HPI, Physical Exam and Medical Decision Making performed by [email or web address] cesar is based on my personal performance of the HPI, PE and MDM. For Physician Assistant/ Nurse Practitioner cases/documentation I have personally evaluated this patient and have completed at least one if not all key elements of the E/M (history, physical exam, and MDM). Additional findings are as noted. 41 yo M transferred for mentation change & respiratory failure, ct head at outlying facility,   pe vss intubated, diego, no cervical deformity,   Chest symmetric, abdomen flat non distended,   Extremities nv intact x 4,     Ct cervical spine -, ct no pe,   icu admit, + etoh  admitted    EKG Interpretation    Interpreted by me  Normal sinus, heart rate 61, biphasic T waves V2 V3  -QT corrected 509  -- EKG compared to EKG from July 15, 2021, stable,    CRITICAL CARE: There was a high probability of clinically significant/life threatening deterioration in this patient's condition which required my urgent intervention. Total critical care time was 20 minutes. This excludes any time for separately reportable procedures.        Bobby [address] [facility], DO  06/18/22 [address], DO  06/18/22 [address], DO  06/18/22 [address] [facility], DO  06/18/22 1222

## 2022-06-18 NOTE — PLAN OF CARE
Problem: Discharge Planning  Goal: Discharge to home or other facility with appropriate resources  Outcome: Not Progressing     Problem: Safety - Medical Restraint  Goal: Remains free of injury from restraints (Restraint for Interference with Medical Device)  Description: INTERVENTIONS:  1. Determine that other, less restrictive measures have been tried or would not be effective before applying the restraint  2. Evaluate the patient's condition at the time of restraint application  3. Inform patient/family regarding the reason for restraint  4.  Q2H: Monitor safety, psychosocial status, comfort, nutrition and hydration  Outcome: Not Progressing  Flowsheets (Taken 6/18/2022 0617 by Kaye Archer RN)  Remains free of injury from restraints (restraint for interference with medical device): Every 2 hours: Monitor safety, psychosocial status, comfort, nutrition and hydration     Problem: Pain  Goal: Verbalizes/displays adequate comfort level or baseline comfort level  Outcome: Not Progressing     Problem: Neurosensory - Adult  Goal: Achieves stable or improved neurological status  Outcome: Not Progressing  Goal: Absence of seizures  Outcome: Not Progressing  Goal: Remains free of injury related to seizures activity  Outcome: Not Progressing  Goal: Achieves maximal functionality and self care  Outcome: Not Progressing     Problem: Cardiovascular - Adult  Goal: Maintains optimal cardiac output and hemodynamic stability  Outcome: Not Progressing  Goal: Absence of cardiac dysrhythmias or at baseline  Outcome: Not Progressing     Problem: Respiratory - Adult  Goal: Achieves optimal ventilation and oxygenation  Outcome: Not Progressing     Problem: Gastrointestinal - Adult  Goal: Minimal or absence of nausea and vomiting  Outcome: Not Progressing  Goal: Maintains or returns to baseline bowel function  Outcome: Not Progressing  Goal: Maintains adequate nutritional intake  Outcome: Not Progressing     Problem: Genitourinary - Adult  Goal: Absence of urinary retention  Outcome: Not Progressing  Goal: Urinary catheter remains patent  Outcome: Not Progressing     Problem: Metabolic/Fluid and Electrolytes - Adult  Goal: Electrolytes maintained within normal limits  Outcome: Not Progressing  Goal: Hemodynamic stability and optimal renal function maintained  Outcome: Not Progressing  Goal: Glucose maintained within prescribed range  Outcome: Not Progressing     Problem: Skin/Tissue Integrity - Adult  Goal: Skin integrity remains intact  Outcome: Not Progressing  Goal: Incisions, wounds, or drain sites healing without S/S of infection  Outcome: Not Progressing  Goal: Oral mucous membranes remain intact  Outcome: Not Progressing     Problem: Hematologic - Adult  Goal: Maintains hematologic stability  Outcome: Not Progressing     Problem: Musculoskeletal - Adult  Goal: Return mobility to safest level of function  Outcome: Not Progressing  Goal: Maintain proper alignment of affected body part  Outcome: Not Progressing  Goal: Return ADL status to a safe level of function  Outcome: Not Progressing

## 2022-06-18 NOTE — PROGRESS NOTES
Comprehensive Nutrition Assessment    Type and Reason for Visit:  Initial (Vent Check)    Nutrition Recommendations/Plan:   Continue NPO. Monitor plans for nutrition. If TF requested, suggest Peptide Based goal rate 50 mL/hr with current rate of propofol = 1440 kcals, 90 gm pro/day. Will monitor plan of care. Malnutrition Assessment:  Malnutrition Status:  Insufficient data (06/18/22 6967)    Context:  Acute Illness     Findings of the 6 clinical characteristics of malnutrition:  Energy Intake:  Mild decrease in energy intake  Weight Loss:  Unable to assess     Body Fat Loss:  Unable to assess   Muscle Mass Loss:  Unable to assess  Fluid Accumulation:  No significant fluid accumulation   Strength:  Not Performed    Nutrition Assessment:    Admitted respiratory arrest following respiratory depression,  Pt intubated and sedated. Propofol rate fluctuations noted. No nutrition being provided - will provide recommendations and monitor plan of care. Labs reviewed: K 3.2 mmol/L. Meds reviewed. Nutrition Related Findings:    Labs/Meds reviewed. Wound Type: None       Current Nutrition Intake & Therapies:    Average Meal Intake: NPO  Average Supplements Intake: NPO  Diet NPO  Additional Calorie Sources:  · Propofol running at 10.2-18.4 mL/hr = 269-486 kcals/day    Anthropometric Measures:  Height: 5' 9\" (175.3 cm)  Ideal Body Weight (IBW): 160 lbs (73 kg)    Admission Body Weight: 138 lb 7.2 oz (62.8 kg)  Current Body Weight: 151 lb 11.2 oz (68.8 kg), 94.8 % IBW. Weight Source: Bed Scale  Current BMI (kg/m2): 22.4  Usual Body Weight:  (138-150 lb noted per chart review)                       BMI Categories: Normal Weight (BMI 18.5-24. 9)    Estimated Daily Nutrient Needs:  Energy Requirements Based On: Kcal/kg  Weight Used for Energy Requirements: Current  Energy (kcal/day): 9128-9565 kcals/day  Weight Used for Protein Requirements: Current  Protein (g/day):  gm pro/day  Method Used for Fluid Requirements: ml/Kg  Fluid (ml/day): 35 mL/kg = 2400 mL/day or per MD    Nutrition Diagnosis:   · Inadequate oral intake related to impaired respiratory function,acute injury/trauma as evidenced by NPO or clear liquid status due to medical condition    Nutrition Interventions:   Food and/or Nutrient Delivery: Continue NPO (Monitor need/plans for nutrition support.)  Nutrition Education/Counseling: No recommendation at this time  Coordination of Nutrition Care: Continue to monitor while inpatient       Goals:  Previous Goal Met:  (Goal Set)  Goals: Initiate nutrition support,by next RD assessment       Nutrition Monitoring and Evaluation:   Behavioral-Environmental Outcomes: None Identified  Food/Nutrient Intake Outcomes: Diet Advancement/Tolerance  Physical Signs/Symptoms Outcomes: Biochemical Data,GI Status,Fluid Status or Edema,Hemodynamic Status,Nutrition Focused Physical Findings,Skin,Weight    Discharge Planning:     Too soon to determine     Deamelia Bee RD, LD  Contact: [phone]

## 2022-06-18 NOTE — ED PROVIDER NOTES
After Visit Summary      Facility     Name Address Phone Fax    [facility] [address] [place] [address] [phone]           Patient Discharge Summary   2017    Shun Mtz            Please bring this medication reconciliation form to your next doctor’s appointment(s). Please update the form if you stop taking any of these medications or you start taking any new medications including over the counter medications. Also please carry a copy of this form with you at all times in the event of an emergency.    A copy of these discharge instructions was reviewed with and given to the patient/patient representative/Guardian/Caregiver at discharge.          The doctor who took care of you in the hospital     Provider Specialty    Augustina Thornton MD Pediatrics      Allergies as of 2017     No Known Allergies           Discharge Medications              What to Do with Your Medications      Notice     You have not been prescribed any medications.              Discharge Instructions        Discharge Instructions:    Shun Mtz is a 2 day old  infant, delivered at Gestational Age: 39w5d.    discharged to home, accompanied by mom and dad.    If you have any questions about your baby, please call your baby's doctor    Do not give your baby any medications unless directed by your Pediatrician    Call the doctor if:  · Fever of 100 degrees F or above (37.8 Celsius)  · Infant feels cool or the temperature is below 97.7 F rectally (36.5 Celsius)  · Forceful vomiting (not spitting up)  · Several feedings when infant does not suck  · Watery, runny stools (with mucous, blood or foul odor)  · Infant injury (fall from bed or table, dropped or severely shaken, or any other injuries)  · Constant crying  · Has problems breathing  · Any unusual rash  · Yellow color of the skin or eyes  · Has less than 6 wet diapers in a  [facility] ED  Emergency Department Encounter  EmergencyMedicine Resident     Pt Name:Segundo Jane  MRN: [id number]  [place] 1985  Date of evaluation: 6/18/22  PCP:  Kelly Cortez    This patient was evaluated in the Emergency Department for symptoms described in the history of present illness. The patient was evaluated in the context of the global COVID-19 pandemic, which necessitated consideration that the patient might be at risk for infection with the SARS-CoV-2 virus that causes COVID-19. Institutional protocols and algorithms that pertain to the evaluation of patients at risk for COVID-19 are in a state of rapid change based on information released by regulatory bodies including the CDC and federal and state organizations. These policies and algorithms were followed during the patient's care in the ED. CHIEF COMPLAINT       Chief Complaint   Patient presents with    Respiratory Arrest       HISTORY OF PRESENT ILLNESS  (Location/Symptom, Timing/Onset, Context/Setting, Quality, Duration, Modifying Factors, Severity.)      Vadim Estrada is a 40 y.o. male who presents with respiratory arrest.  Patient was speaking with girlfriend, drinking alcohol, had acute onset respiratory arrest, still had pulses, called EMS who transported patient to outside facility, patient was intubated on arrival.  Unable to obtain HPI or ROS from patient. Family is providing information. Patient has history of alcohol use, was drinking a significant mount of alcohol today. Outside facility obtain CT head which was negative, labs which were unremarkable. PAST MEDICAL / SURGICAL / SOCIAL / FAMILY HISTORY      has a past medical history of Heart abnormality and Hypertension. Alcohol use     has no past surgical history on file.   Unknown    Social History     Socioeconomic History    Marital status: Single     Spouse name: Not on file    Number of children: Not on file    Years of education: Not on file 24 hour period after the fifth day of life  · No stool (bowel movement) for 48 hours  · Redness, drainage or foul odor from the umbilical cord and/or circumcision site        Special instructions: In case you need to call the doctor about any of the above:    · Take baby's temperature and write it down  · Know how much and how many feedings the baby has had that day  · Note amount, color and consistency of urine and stools  · Note any changes in the infants behavior such as being very sleepy, very fussy or less active      Date and time of Delivery: 2017  5:01 PM     Delivery Method: Vaginal, Vacuum (Extractor) [254]          APGAR'S  One minute Five minutes   Skin color:       Heart rate:       Grimace:       Muscle tone:       Breathing:       Totals: 8  9      Feeding method: Natural Human Milk  Infant Blood Type: A POSITIVE  Bilirubin   TOTAL BILIRUBIN (mg/dL)   Date Value   2017 (HH)       Screen: Done  on 2017  Immunizations:   Most Recent Immunizations   Administered Date(s) Administered   • None   Deferred Date(s) Deferred   • Hepatitis B Immune Globulin 2017       Hearing Test Machine: Auditory Brainstem Response (Algo) (17 1705)  [place] Hearing Test Results: Pass R;Pass L (17 1705)  Birth Weight: 6 lb 8.4 oz (2960 g)    Discharge weight: Weight: 2850 g  Discharge Date: 2017    Help after you leave the hospital:    Ideas for help at home: friends, family members, neighbors, and members of your [place] community are all there to help you.    For breastfeeding support call:  [facility] at [phone].      Special Instructions: Use phototherapy light as instructed by Dr. Ocampo.  Follow up tomorrow at 9:00am with pediatrician.      Discharge References/Attachments     None      Pending Labs/Results     Any lab or diagnostic test results that are \"pending\" at time of discharge are listed below. If no results   Highest education level: Not on file   Occupational History    Not on file   Tobacco Use    Smoking status: Current Every Day Smoker     Types: Cigarettes    Smokeless tobacco: Former User   Vaping Use    Vaping Use: Every day    Substances: Nicotine   Substance and Sexual Activity    Alcohol use: Yes    Drug use: Never    Sexual activity: Yes     Partners: Female   Other Topics Concern    Not on file   Social History Narrative    Not on file     Social Determinants of Health     Financial Resource Strain:     Difficulty of Paying Living Expenses: Not on file   Food Insecurity:     Worried About Running Out of Food in the Last Year: Not on file    Tre of Food in the Last Year: Not on file   Transportation Needs:     Lack of Transportation (Medical): Not on file    Lack of Transportation (Non-Medical): Not on file   Physical Activity:     Days of Exercise per Week: Not on file    Minutes of Exercise per Session: Not on file   Stress:     Feeling of Stress : Not on file   Social Connections:     Frequency of Communication with Friends and Family: Not on file    Frequency of Social Gatherings with Friends and Family: Not on file    Attends Jain Services: Not on file    Active Member of [address] [place] [address] or Organizations: Not on file    Attends Club or Organization Meetings: Not on file    Marital Status: Not on file   Intimate Partner Violence:     Fear of Current or Ex-Partner: Not on file    Emotionally Abused: Not on file    Physically Abused: Not on file    Sexually Abused: Not on file   Housing Stability:     Unable to Pay for Housing in the Last Year: Not on file    Number of [place] in the Last Year: Not on file    Unstable Housing in the Last Year: Not on file       History reviewed. No pertinent family history. Allergies:  Bee pollen    Home Medications:  Prior to Admission medications    Medication Sig Start Date End Date Taking?  Authorizing Provider   traZODone (DESYREL) 50 MG tablet Take 1 tablet by mouth nightly as needed for Sleep 7/16/21   Addy Wynn MD   metoprolol succinate (TOPROL XL) 50 MG extended release tablet Take 1 tablet by mouth daily 7/17/21   Addy Wynn MD   Multiple Vitamins-Minerals (THERAPEUTIC MULTIVITAMIN-MINERALS) tablet Take 1 tablet by mouth daily 7/16/21   Addy Wynn MD   thiamine mononitrate (THIAMINE) 100 MG tablet Take 1 tablet by mouth daily 7/16/21   Addy Wynn MD   ibuprofen (ADVIL;MOTRIN) 200 MG tablet Take 800 mg by mouth every 8 hours as needed for Pain (takes daily for headache)    Historical Provider, MD       REVIEW OF SYSTEMS    (2-9 systems for level 4, 10 or more for level 5)      Review of Systems    PHYSICAL EXAM   (up to 7 for level 4, 8 or more for level 5)      INITIAL VITALS:   /65   Pulse (!) 48   Temp 97.4 °F (36.3 °C)   Resp 18   SpO2 100%     Physical Exam  Constitutional:       Appearance: He is well-developed. He is not diaphoretic. Comments: Patient is intubated and sedated, not diaphoretic   HENT:      Head: Normocephalic and atraumatic. Right Ear: Tympanic membrane, ear canal and external ear normal. There is no impacted cerumen. Left Ear: Tympanic membrane, ear canal and external ear normal. There is no impacted cerumen. Nose: Nose normal. No congestion or rhinorrhea. Mouth/Throat:      Mouth: Mucous membranes are moist.      Pharynx: Oropharynx is clear. No oropharyngeal exudate or posterior oropharyngeal erythema. Eyes:      General:         Right eye: No discharge. Left eye: No discharge. Extraocular Movements: Extraocular movements intact. Neck:      Vascular: No JVD. Trachea: No tracheal deviation. Cardiovascular:      Rate and Rhythm: Normal rate and regular rhythm. Heart sounds: Normal heart sounds. No murmur heard. No friction rub. No gallop. Pulmonary:      Effort: Pulmonary effort is normal. No respiratory distress. display then none were \"pending\" at time of discharge. Depending on when the test was completed results may be available within 3-5 days. Results can be reviewed with your provider at your next visits or through [facility]. If needed contact the provider office for additional information.          Order Current Status    [place] Metabolic Screen In process       Your Opinion Matters To Us  If you receive a patient satisfaction survey in the mail, please complete and return it in the postage-paid envelope.    We truly value and appreciate your feedback.           Shun Mtz        Contact your doctor for follow-up appointment if not already scheduled.     Follow up with WILLIAM Wigigns In 1 day.    Specialty:  Nurse Practitioner - Pediatrics    Comments:  office visit scheduled in the [place] location for 17 at 0900    Contact information    [address] [place] [id number]  [phone]           Shun Mtz           Summary of your Discharge Medications      Notice     You have not been prescribed any medications.               Outpatient Administered Medication List      Notice     You have not been prescribed any facility-administered medications.       Breath sounds: Normal breath sounds. No stridor. No wheezing, rhonchi or rales. Chest:      Chest wall: No tenderness. Abdominal:      General: There is no distension. Palpations: Abdomen is soft. There is no mass. Tenderness: There is no abdominal tenderness. There is no right CVA tenderness, left CVA tenderness or guarding. Musculoskeletal:         General: No tenderness. Normal range of motion. Cervical back: Normal range of motion and neck supple. No rigidity or tenderness. Right lower leg: No edema. Left lower leg: No edema. Skin:     General: Skin is warm. Capillary Refill: Capillary refill takes less than 2 seconds. Findings: No lesion or rash.          DIFFERENTIAL  DIAGNOSIS     PLAN (LABS / IMAGING / EKG):  Orders Placed This Encounter   Procedures    COVID-19, Rapid    Culture, Blood 1    Culture, Blood 2    Sputum gram stain    XR CHEST PORTABLE    CT CHEST PULMONARY EMBOLISM W CONTRAST    CT CERVICAL SPINE WO CONTRAST    CBC with Auto Differential    Comprehensive Metabolic Panel w/ Reflex to MG    TOX SCR, BLD, ED    TSH with Reflex    Troponin    ELECTROLYTES PLUS    Hemoglobin and hematocrit, blood    CALCIUM, IONIC (POC)    Troponin    Troponin    Comprehensive Metabolic Panel w/ Reflex to MG    CBC with Auto Differential    Magnesium    ELECTROLYTES PLUS    Hemoglobin and hematocrit, blood    CALCIUM, IONIC (POC)    Diet NPO    Vital signs per unit routine    Daily weights    Intake and output    Place intermittent pneumatic compression device    Strict Bedrest    Full Code    Inpatient consult to Critical Care    ABG draw    End Tidal CO2 Continuous    Pulse oximetry, continuous    Initiate Oxygen Therapy Protocol    Manual Mechanical Ventilation    Initiate Oxygen Therapy Protocol    Respiratory care evaluation only    Sputum induction    POCT Glucose    Venous Blood Gas, POC    Creatinine W/GFR Point of Care    POCT urea (BUN)    Lactic Acid, POC    POCT Glucose    Arterial Blood Gas, POC    Creatinine W/GFR Point of Care    POCT urea (BUN)    Lactic Acid, POC    POCT Glucose    EKG 12 Lead    ECHO Complete 2D W Doppler W Color    ADMIT TO INPATIENT       MEDICATIONS ORDERED:  Orders Placed This Encounter   Medications    propofol 1000 MG/100ML injection     Jennie Escobar: cabinet override    propofol injection     Order Specific Question:   Titrate Infusion? Answer:   Yes     Order Specific Question:   Initial Infusion Dose:      Answer:   20 mcg/kg/min     Order Specific Question:   Goal of Therapy:     Answer:   RASS of -1 to 0     Order Specific Question:   Contact Provider if:     Answer:   New onset HR less than 50 bpm     Order Specific Question:   Contact Provider if:     Answer:   New onset SBP less than 90 mmHg     Order Specific Question:   Contact Provider if:     Answer:   Patient is receiving maximum dose and is not achieving the goal of therapy     Order Specific Question:   Contact Provider if:     Answer:   Triglycerides greater than 500 mg/dL    fentaNYL (SUBLIMAZE) injection 100 mcg    0.9 % sodium chloride bolus    sodium chloride flush 0.9 % injection 5-40 mL    sodium chloride flush 0.9 % injection 5-40 mL    0.9 % sodium chloride infusion    enoxaparin (LOVENOX) injection 40 mg     Order Specific Question:   Indication of Use     Answer:   Prophylaxis-DVT/PE    OR Linked Order Group     ondansetron (ZOFRAN-ODT) disintegrating tablet 4 mg     ondansetron (ZOFRAN) injection 4 mg    polyethylene glycol (GLYCOLAX) packet 17 g    OR Linked Order Group     acetaminophen (TYLENOL) tablet 650 mg     acetaminophen (TYLENOL) suppository 650 mg    famotidine (PEPCID) 20 mg in sodium chloride (PF) 10 mL injection    OR Linked Order Group     potassium chloride (KLOR-CON M) extended release tablet 40 mEq     potassium bicarb-citric acid (EFFER-K) effervescent tablet 40 mEq     potassium chloride 10 mEq/100 mL IVPB (Peripheral Line)    iopamidol (ISOVUE-370) 76 % injection 75 mL       DDX:     DIAGNOSTIC RESULTS / EMERGENCY DEPARTMENT COURSE / MDM   LAB RESULTS:  Results for orders placed or performed during the hospital encounter of 06/18/22   COVID-19, Rapid    Specimen: Nasopharyngeal Swab   Result Value Ref Range    Specimen Description . NASOPHARYNGEAL SWAB     SARS-CoV-2, Rapid Not Detected Not Detected   Culture, Blood 1    Specimen: Blood   Result Value Ref Range    Specimen Description . BLOOD     Special Requests RIGHT FOREARM 10ML     Culture NO GROWTH <24 HRS    Culture, Blood 2    Specimen: Blood   Result Value Ref Range    Specimen Description . BLOOD     Special Requests LEFT AC 7ML     Culture NO GROWTH <24 HRS    CBC with Auto Differential   Result Value Ref Range    WBC 7.0 3.5 - 11.3 k/uL    RBC 4.16 (L) 4.21 - 5.77 m/uL    Hemoglobin 13.3 13.0 - 17.0 g/dL    Hematocrit 39.2 (L) 40.7 - 50.3 %    MCV 94.2 82.6 - 102.9 fL    MCH 32.0 25.2 - 33.5 pg    MCHC 33.9 28.4 - 34.8 g/dL    RDW 12.7 11.8 - 14.4 %    Platelets 251 937 - 663 k/uL    MPV 10.3 8.1 - 13.5 fL    NRBC Automated 0.0 0.0 per 100 WBC    Seg Neutrophils 61 36 - 65 %    Lymphocytes 31 24 - 43 %    Monocytes 6 3 - 12 %    Eosinophils % 1 1 - 4 %    Basophils 1 0 - 2 %    Immature Granulocytes 0 0 %    Segs Absolute 4.25 1.50 - 8.10 k/uL    Absolute Lymph # 2.17 1.10 - 3.70 k/uL    Absolute Mono # 0.39 0.10 - 1.20 k/uL    Absolute Eos # 0.07 0.00 - 0.44 k/uL    Basophils Absolute 0.06 0.00 - 0.20 k/uL    Absolute Immature Granulocyte <0.03 0.00 - 0.30 k/uL   Comprehensive Metabolic Panel w/ Reflex to MG   Result Value Ref Range    Glucose 97 70 - 99 mg/dL    BUN 10 6 - 20 mg/dL    CREATININE 0.97 0.70 - 1.20 mg/dL    Calcium 8.1 (L) 8.6 - 10.4 mg/dL    Sodium 137 135 - 144 mmol/L    Potassium 3.2 (L) 3.7 - 5.3 mmol/L    Chloride 101 98 - 107 mmol/L    CO2 20 20 - 31 mmol/L    Anion Gap 16 9 - 17 mmol/L    Alkaline Phosphatase 45 40 - 129 U/L    ALT 18 5 - 41 U/L    AST 22 <40 U/L    Total Bilirubin 0.23 (L) 0.3 - 1.2 mg/dL    Total Protein 5.8 (L) 6.4 - 8.3 g/dL    Albumin 4.1 3.5 - 5.2 g/dL    Albumin/Globulin Ratio 2.4 1.0 - 2.5    GFR Non-African American >60 >60 mL/min    GFR African American >60 >60 mL/min    GFR Comment         TOX SCR, BLD, ED   Result Value Ref Range    Acetaminophen Level <5 (L) 10 - 30 ug/mL    Ethanol 148 (H) <10 mg/dL    Ethanol percent 0.148 (H) <6.625 %    Salicylate Lvl <1 (L) 3 - 10 mg/dL    Toxic Tricyclic Sc,Blood NEGATIVE NEGATIVE   TSH with Reflex   Result Value Ref Range    TSH 2.10 0.30 - 5.00 uIU/mL   ELECTROLYTES PLUS   Result Value Ref Range    POC Sodium 143 138 - 146 mmol/L    POC Potassium 3.0 (L) 3.5 - 4.5 mmol/L    POC Chloride 108 (H) 98 - 107 mmol/L    POC TCO2 27 22 - 30 mmol/L    Anion Gap 9 7 - 16 mmol/L   Hemoglobin and hematocrit, blood   Result Value Ref Range    POC Hemoglobin 14.0 13.5 - 17.5 g/dL    POC Hematocrit 41 41 - 53 %   CALCIUM, IONIC (POC)   Result Value Ref Range    POC Ionized Calcium 1.07 (L) 1.15 - 1.33 mmol/L   Troponin   Result Value Ref Range    Troponin, High Sensitivity 7 0 - 22 ng/L   Magnesium   Result Value Ref Range    Magnesium 1.9 1.6 - 2.6 mg/dL   ELECTROLYTES PLUS   Result Value Ref Range    POC Sodium 144 138 - 146 mmol/L    POC Potassium 3.3 (L) 3.5 - 4.5 mmol/L    POC Chloride 110 (H) 98 - 107 mmol/L    POC TCO2 22 22 - 30 mmol/L    Anion Gap 13 7 - 16 mmol/L   Hemoglobin and hematocrit, blood   Result Value Ref Range    POC Hemoglobin 12.7 (L) 13.5 - 17.5 g/dL    POC Hematocrit 37 (L) 41 - 53 %   CALCIUM, IONIC (POC)   Result Value Ref Range    POC Ionized Calcium 1.13 (L) 1.15 - 1.33 mmol/L   POCT Glucose   Result Value Ref Range    Glucose 94 mg/dL    QC OK?  yes    Venous Blood Gas, POC   Result Value Ref Range    pH, Gutierrez 7.329 7.320 - 7.430    pCO2, Gutierrez 50.9 41.0 - 51.0 mm Hg    pO2, Gutierrez 33.3 30.0 - 50.0 mm Hg    HCO3, Venous 26.8 22.0 - 29.0 mmol/L    Positive Base Excess, Gutierrez 0 0.0 - 3.0    O2 Sat, Gutierrez 59 (L) 60.0 - 85.0 %   Creatinine W/GFR Point of Care   Result Value Ref Range    POC Creatinine 1.20 (H) 0.51 - 1.19 mg/dL    GFR Comment >60 >60 mL/min    GFR Non-African American >60 >60 mL/min    GFR Comment         POCT urea (BUN)   Result Value Ref Range    POC BUN 9 8 - 26 mg/dL   Lactic Acid, POC   Result Value Ref Range    POC Lactic Acid 2.15 (H) 0.56 - 1.39 mmol/L   POCT Glucose   Result Value Ref Range    POC Glucose 94 74 - 100 mg/dL   Arterial Blood Gas, POC   Result Value Ref Range    POC pH 7.354 7.350 - 7.450    POC pCO2 39.6 35.0 - 48.0 mm Hg    POC PO2 131.9 (H) 83.0 - 108.0 mm Hg    POC HCO3 22.0 21.0 - 28.0 mmol/L    Negative Base Excess, Art 3 (H) 0.0 - 2.0    POC O2 SAT 99 (H) 94.0 - 98.0 %    O2 Device/Flow/% Adult Ventilator     Sample Site Right Radial Artery     Mode PRVC     FIO2 30.0    Creatinine W/GFR Point of Care   Result Value Ref Range    POC Creatinine 0.87 0.51 - 1.19 mg/dL    GFR Comment >60 >60 mL/min    GFR Non-African American >60 >60 mL/min    GFR Comment         POCT urea (BUN)   Result Value Ref Range    POC BUN 9 8 - 26 mg/dL   Lactic Acid, POC   Result Value Ref Range    POC Lactic Acid 2.16 (H) 0.56 - 1.39 mmol/L   POCT Glucose   Result Value Ref Range    POC Glucose 90 74 - 100 mg/dL       IMPRESSION: Patient arrives from outside facility, intubated, sedated, respiratory failure, no cardiac arrest, CT head negative, will obtain CT cervical spine, chest to evaluate for pulmonary embolism. Will obtain troponin, EKG to evaluate for ACS. Will obtain basic labs to reevaluate. Will admit patient to the ICU.     RADIOLOGY:  CT CERVICAL SPINE WO CONTRAST    Result Date: 6/18/2022  EXAMINATION: CT OF THE CERVICAL SPINE WITHOUT CONTRAST 6/18/2022 2:44 am TECHNIQUE: CT of the cervical spine was performed without the administration of intravenous contrast. Multiplanar reformatted images are provided for review. Automated exposure control, iterative reconstruction, and/or weight based adjustment of the mA/kV was utilized to reduce the radiation dose to as low as reasonably achievable. COMPARISON: Chest CT today. HISTORY: ORDERING SYSTEM PROVIDED HISTORY: Found down TECHNOLOGIST PROVIDED HISTORY: Found down Decision Support Exception - unselect if not a suspected or confirmed emergency medical condition->Emergency Medical Condition (MA) Reason for Exam: Found down FINDINGS: BONES/ALIGNMENT: There is no acute fracture or traumatic malalignment. 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 DEGENERATIVE CHANGES: No significant degenerative changes. SOFT TISSUES: There is no prevertebral soft tissue swelling. Nodular apical pleuroparenchymal scarring and mild paraseptal emphysematous changes are noted as described on chest CT today. Endotracheal and enteric tubes present, coursing off the field of view. No acute abnormality of the cervical spine. XR CHEST PORTABLE    Result Date: 6/18/2022  EXAMINATION: ONE XRAY VIEW OF THE CHEST 6/18/2022 1:08 am COMPARISON: 06/17/2022 HISTORY: ORDERING SYSTEM PROVIDED HISTORY: ett TECHNOLOGIST PROVIDED HISTORY: ett FINDINGS: The endotracheal tube terminates 4 cm above the daly. The enteric tube courses off the field of view in the upper abdomen. No acute airspace disease, pneumothorax or effusion. 1.  Endotracheal tube terminates 4 cm above the daly. 2.  The enteric tube courses off the field of view in the upper abdomen. 3.  No acute airspace disease identified. CT CHEST PULMONARY EMBOLISM W CONTRAST    Result Date: 6/18/2022  EXAMINATION: CTA OF THE CHEST 6/18/2022 2:44 am TECHNIQUE: CTA of the chest was performed after the administration of intravenous contrast.  Multiplanar reformatted images are provided for review. MIP images are provided for review.  Automated exposure control, iterative reconstruction, and/or weight based adjustment of the mA/kV was utilized to reduce the radiation dose to as low as reasonably achievable. COMPARISON: Chest radiograph 06/17/2022 HISTORY: ORDERING SYSTEM PROVIDED HISTORY: Respiratory failure TECHNOLOGIST PROVIDED HISTORY: Respiratory failure Decision Support Exception - unselect if not a suspected or confirmed emergency medical condition->Emergency Medical Condition (MA) Reason for Exam: Found down FINDINGS: Pulmonary Arteries: Pulmonary arteries are adequately opacified for evaluation. No evidence of intraluminal filling defect to suggest pulmonary embolism. Main pulmonary artery is normal in caliber. Mediastinum: No evidence of mediastinal lymphadenopathy. The heart and pericardium demonstrate no acute abnormality. There is no acute abnormality of the thoracic aorta. The endotracheal tube terminates in appropriate position above the daly. Lungs/pleura: Apical pleuroparenchymal scarring is noted. Bilateral apical nodules are also present measuring up to 9 mm in the right upper lobe on axial image 32. Paraseptal emphysematous disease. No pneumothorax or effusion. Mild dependent atelectasis in the lung bases. The central airway is patent. Upper Abdomen: No acute findings. The enteric tube terminates in the body of the stomach. Soft Tissues/Bones: No acute bone or soft tissue abnormality. 1.  No evidence for acute pulmonary embolism. 2.  Mild dependent opacities in the lung bases compatible with subsegmental atelectasis. 3.  Apical pleuroparenchymal scarring with nodular appearance measuring up to 9 mm in the right lung apex. In the absence of prior imaging, follow-up is recommended as described below. 4.  Endotracheal tube terminates in appropriate position. Enteric tube terminates in the body of the stomach. RECOMMENDATIONS: Multiple pulmonary nodules. Most severe: 9 mm solid pulmonary nodule within the upper lobe. Consider a non-contrast Chest CT at 3 months, a PET/CT, or tissue sampling.  These guidelines do not apply to immunocompromised patients and patients with cancer. Follow up in patients with significant comorbidities as clinically warranted. For lung cancer screening, adhere to Lung-RADS guidelines. Reference: Radiology. 2017; 284(1):228-43. EKG  EKG Interpretation    Interpreted by me    Rhythm: normal sinus   Rate: normal  Axis: normal  Ectopy: none  Conduction: normal  ST Segments: no acute change  T Waves: Chronic biphasic T waves  Q Waves: none    Clinical Impression: Chronic biphasic T waves, no acute changes    All EKG's are interpreted by the Emergency Department Physician who either signs or Co-signs this chart in the absence of a cardiologist.    EMERGENCY DEPARTMENT COURSE:  Patient came to emergency department, HPI and physical exam were conducted. All nursing notes were reviewed. EKG found no acute changes, troponins negative. CT imaging found no evidence of pulmonary embolism. Patient remained sedated, admitted to the ICU for further management. No notes of EC Admission Criteria type on file. PROCEDURES:      CONSULTS:  IP CONSULT TO CRITICAL CARE    CRITICAL CARE:      FINAL IMPRESSION      1. Acute respiratory failure, unspecified whether with hypoxia or hypercapnia ([facility] [address]          DISPOSITION / PLAN     DISPOSITION Admitted 06/18/2022 01:13:40 AM      PATIENT REFERRED TO:  No follow-up provider specified.     DISCHARGE MEDICATIONS:  New Prescriptions    No medications on file       Lidya Armenta MD  Emergency Medicine Resident    (Please note that portions of thisnote were completed with a voice recognition program.  Efforts were made to edit the dictations but occasionally words are mis-transcribed.)        Lidya Armenta MD  Resident  06/18/22 [id number]

## 2022-06-18 NOTE — PROGRESS NOTES
The transport originated from [address] Pt. was transported to CT and back. Assisting with the transport was RN + RT. Appropriate devices were applied to monitor the patient's condition during transport. Patient transported  via 100% O2 via ventilator. Patient tolerated well.         Seamus August, ALISHA  3:03 AM

## 2022-06-18 NOTE — PLAN OF CARE
Problem: Discharge Planning  Goal: Discharge to home or other facility with appropriate resources  Recent Flowsheet Documentation  Taken 6/18/2022 0800 by Laura Cuadra RN  Discharge to home or other facility with appropriate resources: Identify barriers to discharge with patient and caregiver  6/18/2022 0749 by Laura Cuadra RN  Outcome: Not Progressing     Problem: Pain  Goal: Verbalizes/displays adequate comfort level or baseline comfort level  Recent Flowsheet Documentation  Taken 6/18/2022 1200 by Laura Cuadra RN  Verbalizes/displays adequate comfort level or baseline comfort level:   Assess pain using appropriate pain scale   Administer analgesics based on type and severity of pain and evaluate response   Implement non-pharmacological measures as appropriate and evaluate response   Consider cultural and social influences on pain and pain management  Taken 6/18/2022 0800 by Laura Cuadra RN  Verbalizes/displays adequate comfort level or baseline comfort level:   Administer analgesics based on type and severity of pain and evaluate response   Assess pain using appropriate pain scale   Implement non-pharmacological measures as appropriate and evaluate response   Consider cultural and social influences on pain and pain management   Notify Licensed Independent Practitioner if interventions unsuccessful or patient reports new pain  6/18/2022 0749 by Laura Cuadra RN  Outcome: Not Progressing     Problem: Neurosensory - Adult  Goal: Achieves stable or improved neurological status  Outcome: Not Progressing  Goal: Absence of seizures  Outcome: Not Progressing  Goal: Remains free of injury related to seizures activity  Outcome: Not Progressing  Goal: Achieves maximal functionality and self care  Outcome: Not Progressing     Problem: Cardiovascular - Adult  Goal: Maintains optimal cardiac output and hemodynamic stability  Recent Flowsheet Documentation  Taken 6/18/2022 0800 by Emery Hills RN  Maintains optimal cardiac output and hemodynamic stability:   Monitor blood pressure and heart rate   Monitor urine output and notify Licensed Independent Practitioner for values outside of normal range   Assess for signs of decreased cardiac output   Administer fluid and/or volume expanders as ordered   Administer vasoactive medications as ordered  6/18/2022 0749 by Laith Zamora RN  Outcome: Not Progressing  Goal: Absence of cardiac dysrhythmias or at baseline  Outcome: Not Progressing     Problem: Gastrointestinal - Adult  Goal: Minimal or absence of nausea and vomiting  Recent Flowsheet Documentation  Taken 6/18/2022 0800 by Laith Zamora RN  Minimal or absence of nausea and vomiting:   Administer IV fluids as ordered to ensure adequate hydration   Maintain NPO status until nausea and vomiting are resolved   Nasogastric tube to low intermittent suction as ordered   Administer ordered antiemetic medications as needed   Provide nonpharmacologic comfort measures as appropriate   Advance diet as tolerated, if ordered  6/18/2022 0749 by Laith Zamora RN  Outcome: Not Progressing  Goal: Maintains or returns to baseline bowel function  Recent Flowsheet Documentation  Taken 6/18/2022 0800 by Laith Zamora RN  Maintains or returns to baseline bowel function:   Assess bowel function   Administer IV fluids as ordered to ensure adequate hydration   Administer ordered medications as needed   Nutrition consult to assist patient with appropriate food choices  6/18/2022 0749 by Laith Zamora RN  Outcome: Not Progressing  Goal: Maintains adequate nutritional intake  Recent Flowsheet Documentation  Taken 6/18/2022 0800 by Emery Hills RN  Maintains adequate nutritional intake:   Monitor intake and output, weight and lab values   Obtain nutritional consult as needed  6/18/2022 0749 by Laith Zamora RN  Outcome: Not Progressing     Problem: Genitourinary - Adult  Goal: Absence of urinary retention  Recent Flowsheet Documentation  Taken 6/18/2022 0800 by Emma Burgos RN  Absence of urinary retention:   Monitor intake/output and perform bladder scan as needed   Place urinary catheter per Licensed Independent Practitioner order if needed   Discuss with Licensed Independent Practitioner  medications to alleviate retention as needed   Discuss catheterization for long term situations as appropriate  6/18/2022 0749 by Emma Burgos RN  Outcome: Not Progressing  Goal: Urinary catheter remains patent  Recent Flowsheet Documentation  Taken 6/18/2022 0800 by Emma Burgos RN  Urinary catheter remains patent: Assess patency of urinary catheter  6/18/2022 0749 by Emma Burgos RN  Outcome: Not Progressing     Problem: Metabolic/Fluid and Electrolytes - Adult  Goal: Electrolytes maintained within normal limits  Recent Flowsheet Documentation  Taken 6/18/2022 0800 by Emma Burgos RN  Electrolytes maintained within normal limits:   Monitor labs and assess patient for signs and symptoms of electrolyte imbalances   Administer electrolyte replacement as ordered   Monitor response to electrolyte replacements, including repeat lab results as appropriate   Fluid restriction as ordered  6/18/2022 0749 by Emma Burgos RN  Outcome: Not Progressing  Goal: Hemodynamic stability and optimal renal function maintained  Recent Flowsheet Documentation  Taken 6/18/2022 0800 by Emery Hills RN  Hemodynamic stability and optimal renal function maintained: Monitor labs and assess for signs and symptoms of volume excess or deficit  6/18/2022 0749 by Emma Burgos RN  Outcome: Not Progressing  Goal: Glucose maintained within prescribed range  Recent Flowsheet Documentation  Taken 6/18/2022 0800 by Emma Burgos RN  Glucose maintained within prescribed range: Monitor blood glucose as ordered  6/18/2022 0749 by Emma Burgos RN  Outcome: Not Progressing     Problem: Skin/Tissue Integrity - Adult  Goal: Skin integrity remains intact  Recent Flowsheet Documentation  Taken 6/18/2022 0800 by Jo-Ann Dyer RN  Skin Integrity Remains Intact:   Monitor for areas of redness and/or skin breakdown   Every 4-6 hours: If on nasal continuous positive airway pressure, respiratory therapy assesses nares and determine need for appliance change or resting period   Every 4-6 hours minimum: Change oxygen saturation probe site   Assess vascular access sites hourly  6/18/2022 0749 by Emery Hills RN  Outcome: Not Progressing  Flowsheets (Taken 6/18/2022 0749)  Skin Integrity Remains Intact:   Assess vascular access sites hourly   Monitor for areas of redness and/or skin breakdown   Every 4-6 hours minimum: Change oxygen saturation probe site   Every 4-6 hours: If on nasal continuous positive airway pressure, respiratory therapy assesses nares and determine need for appliance change or resting period  Goal: Incisions, wounds, or drain sites healing without S/S of infection  Recent Flowsheet Documentation  Taken 6/18/2022 0800 by Emery Hills RN  Incisions, Wounds, or Drain Sites Healing Without Sign and Symptoms of Infection:   TWICE DAILY: Assess and document skin integrity   Implement wound care per orders   Initiate pressure ulcer prevention bundle as indicated  6/18/2022 0749 by Jo-Ann Dyer RN  Outcome: Not Progressing  Flowsheets (Taken 6/18/2022 0749)  Incisions, Wounds, or Drain Sites Healing Without Sign and Symptoms of Infection:   TWICE DAILY: Assess and document skin integrity   TWICE DAILY: Assess and document dressing/incision, wound bed, drain sites and surrounding tissue  Goal: Oral mucous membranes remain intact  Recent Flowsheet Documentation  Taken 6/18/2022 0800 by Emery Hills RN  Oral Mucous Membranes Remain Intact: Assess oral mucosa and hygiene practices  6/18/2022 0749 by Jo-Ann Dyer RN  Outcome: Not Progressing  Flowsheets (Taken 6/18/2022 0749)  Oral Mucous Membranes Remain Intact:   Assess oral mucosa and hygiene practices   Implement preventative oral hygiene regimen   Implement oral medicated treatments as ordered     Problem: Hematologic - Adult  Goal: Maintains hematologic stability  Recent Flowsheet Documentation  Taken 6/18/2022 0800 by Alon Stafford RN  Maintains hematologic stability:   Assess for signs and symptoms of bleeding or hemorrhage   Monitor labs for bleeding or clotting disorders   Administer blood products/factors as ordered  6/18/2022 0749 by Alon Stafford RN  Outcome: Not Progressing     Problem: Musculoskeletal - Adult  Goal: Return mobility to safest level of function  Recent Flowsheet Documentation  Taken 6/18/2022 0800 by Alon Stafford RN  Return Mobility to Safest Level of Function:   Assess patient stability and activity tolerance for standing, transferring and ambulating with or without assistive devices   Assist with transfers and ambulation using safe patient handling equipment as needed   Ensure adequate protection for wounds/incisions during mobilization   Obtain physical therapy/occupational therapy consults as needed   Apply continuous passive motion per provider or physical therapy orders to increase flexion toward goal  6/18/2022 0749 by Alon Stafford RN  Outcome: Not Progressing  Goal: Maintain proper alignment of affected body part  Recent Flowsheet Documentation  Taken 6/18/2022 0800 by Alon Stafford RN  Maintain proper alignment of affected body part: Support and protect limb and body alignment per provider's orders  6/18/2022 0749 by Alon Stafford RN  Outcome: Not Progressing  Goal: Return ADL status to a safe level of function  Recent Flowsheet Documentation  Taken 6/18/2022 0800 by Alon Stafford RN  Return ADL Status to a Safe Level of Function: Administer medication as ordered  6/18/2022 0749 by Alon Stafford RN  Outcome: Not Progressing

## 2022-06-18 NOTE — H&P
Critical Care - History and Physical Examination    Patient's name:  Agatha Patterson  Medical Record Number: [id number]  Patient's account/billing number: [de-identified]  Patient's YOB: 1985  Age: 40 y.o. Date of Admission: 6/18/2022 12:33 AM  Reason of ICU admission:   Date of History and Physical Examination: 6/18/2022      Primary Care Physician: Makenzie Aldana  Attending Physician:    Code Status: Full Code    Chief complaint: Respiratory arrest, respiratory depression    Reason for ICU admission: Intubation      History Of Present Illness:   History was obtained from chart review. Agatha Patterson is a 40 y.o. transfer from outside history with a history of a \"bad aortic valve \"as well as hypertension who presents from outside facility with concerns for a potential respiratory arrest.  As per [facility] note patient was found at home unresponsive, patient required respiratory support via BVM by first responders, patient did not respond to Narcan. Patient does have a history of alcohol abuse, upon arrival to the emergency department patient was intubated with concerns for respiratory depression. No signs of external traumatic injury, patient was sedated with fentanyl, propofol, found to have an EtOH of 195, chest x-ray showed appropriate endotracheal tube placement at outside facility, CT head did not show signs of intracranial abnormality. In our emergency department her labs repeated which showed concerns for hypokalemia, patient appears intubated, sedated, not in acute distress at time upon initial evaluation        Past Medical History:        Diagnosis Date    Heart abnormality     \"bad aortic valve\"     Hypertension        Past Surgical History:  History reviewed. No pertinent surgical history. Allergies: Allergies   Allergen Reactions    Bee Pollen          Home Medications:   Prior to Admission medications    Medication Sig Start Date End Date Taking?  Authorizing Provider traZODone (DESYREL) 50 MG tablet Take 1 tablet by mouth nightly as needed for Sleep 7/16/21   Trey Bailey MD   metoprolol succinate (TOPROL XL) 50 MG extended release tablet Take 1 tablet by mouth daily 7/17/21   Trey Bailey MD   Multiple Vitamins-Minerals (THERAPEUTIC MULTIVITAMIN-MINERALS) tablet Take 1 tablet by mouth daily 7/16/21   Trey Bailey MD   thiamine mononitrate (THIAMINE) 100 MG tablet Take 1 tablet by mouth daily 7/16/21   Trey Bailey MD   ibuprofen (ADVIL;MOTRIN) 200 MG tablet Take 800 mg by mouth every 8 hours as needed for Pain (takes daily for headache)    Historical Provider, MD       Social History:   TOBACCO:   reports that he has been smoking cigarettes. He has quit using smokeless tobacco.  ETOH:   reports current alcohol use. DRUGS:  reports no history of drug use. OCCUPATION:      Family History:   History reviewed. No pertinent family history. REVIEW OF SYSTEMS (ROS):  Review of Systems - Negative except mentioned in HPI   General ROS: negative  Psychological ROS: negative  Ophthalmic ROS: negative  ENT: negative  Hematological and Lymphatic ROS: negative  Endocrine ROS: negative  Breast ROS: negative  Respiratory ROS: no cough, shortness of breath, or wheezing  Cardiovascular ROS: no chest pain or dyspnea on exertion  Gastrointestinal ROS:negative  Genito-Urinary ROS: negative  Musculoskeletal ROS: negative  Neurological ROS: negative  Dermatological ROS: negative      Physical Exam:    Vitals: BP 98/62   Pulse 51   Temp 97.4 °F (36.3 °C)   Resp 16   SpO2 100%     Body weight:   Wt Readings from Last 3 Encounters:   06/17/22 150 lb (68 kg)   07/15/21 140 lb (63.5 kg)       Body Mass Index : There is no height or weight on file to calculate BMI.           PHYSICAL EXAMINATION :  Constitutional: Intubated, sedated  EENT: Pupils 3 mm, sluggishly reactive to light, sclera clear, anicteric, ET tube secured at 23 at the teeth  Neck: Supple, symmetrical, trachea midline, no adenopathy, thyroid symmetric, no jvd skin normal  Respiratory: clear to auscultation, no wheezes or rales and unlabored breathing. No intercostal tenderness  Cardiovascular: regular rate and rhythm, normal S1, S2, no murmur noted and 2+ pulses throughout  Abdomen: soft, nontender, nondistended, no masses or organomegaly  Neurological: Patient intubated, sedated, withdraws to pain  Extremities:  peripheral pulses normal, no pedal edema, no clubbing or cyanosis      Laboratory findings:-    CBC:   Recent Labs     06/18/22 0054   WBC 7.0   HGB 13.3        BMP:    Recent Labs     06/17/22 2240 06/17/22 2240 06/18/22 0043 06/18/22 0054 06/18/22 0054 06/18/22  0115      < >  --  137  --   --    K 3.4*   < >  --  3.2*  --   --       < >  --  101  --   --    CO2 21   < >  --  20  --   --    BUN 10   < >  --  10  --   --    CREATININE 0.84  --  1.20* 0.97  --   --    GLUCOSE 107*   < >  --  97   < > 94    < > = values in this interval not displayed. S. Calcium:  Recent Labs     06/18/22 0054   CALCIUM 8.1*     S. Ionized Calcium:No results for input(s): IONCA in the last 72 hours. S. Magnesium:  Recent Labs     06/18/22 0054   MG 1.9     S. Phosphorus:No results for input(s): PHOS in the last 72 hours. S. Glucose:  Recent Labs     06/17/22 2142 06/18/22  0043   POCGLU 87 94     Glycosylated hemoglobin A1C: No results for input(s): LABA1C in the last 72 hours. INR: No results for input(s): INR in the last 72 hours. Hepatic functions:   Recent Labs     06/17/22 2240 06/17/22 2240 06/18/22 0054   ALKPHOS 52   < > 45   ALT 23   < > 18   AST 28   < > 22   PROT 6.7   < > 5.8*   BILITOT 0.30   < > 0.23*   BILIDIR <0.08  --   --    LABALBU 4.9   < > 4.1    < > = values in this interval not displayed. Pancreatic functions:No results for input(s): LACTA, AMYLASE in the last 72 hours. S. Lactic Acid: No results for input(s): LACTA in the last 72 hours.   Cardiac enzymes:No results for input(s): CKTOTAL, CKMB, CKMBINDEX, TROPONINI in the last 72 hours. BNP:No results for input(s): BNP in the last 72 hours. Lipid profile: No results for input(s): CHOL, TRIG, HDL, LDL, LDLCALC in the last 72 hours. Blood Gases: No results found for: PH, PCO2, PO2, HCO3, O2SAT  Thyroid functions:   Lab Results   Component Value Date    TSH 2.10 06/18/2022        Urinalysis:     Microbiology:  Cultures during this admission:     Blood cultures:           drawn and pending      [] None drawn      [] Negative             []  Positive (Details:  )  Urine Culture:                   [] None drawn      [] Negative             []  Positive (Details:  )  Sputum Culture:               [] None drawn       [] Negative             []  Positive (Details:  )   Endotracheal aspirate:     [] None drawn       [] Negative             []  Positive (Details:  )         -----------------------------------------------------------------  Radiological reports:  CT Head WO Contrast    Result Date: 6/18/2022  EXAMINATION: CT OF THE HEAD WITHOUT CONTRAST  6/17/2022 8:39 pm TECHNIQUE: CT of the head was performed without the administration of intravenous contrast. Automated exposure control, iterative reconstruction, and/or weight based adjustment of the mA/kV was utilized to reduce the radiation dose to as low as reasonably achievable. COMPARISON: None. HISTORY: ORDERING SYSTEM PROVIDED HISTORY: ams TECHNOLOGIST PROVIDED HISTORY: ams Decision Support Exception - unselect if not a suspected or confirmed emergency medical condition->Emergency Medical Condition (MA) FINDINGS: BRAIN/VENTRICLES: There is no acute intracranial hemorrhage, mass effect or midline shift. No abnormal extra-axial fluid collection. The gray-white differentiation is maintained without evidence of an acute infarct. There is no evidence of hydrocephalus. ORBITS: The visualized portion of the orbits demonstrate no acute abnormality.  SINUSES: Partial opacification of the left maxillary sinus is present, with wall thickening, consistent with chronic sinusitis. SOFT TISSUES/SKULL:  No acute abnormality of the visualized skull or soft tissues. Posterior midline defect in the arch of C1 is present related to failure of closure of the ossification center. Patient is intubated. No acute intracranial abnormality. XR CHEST PORTABLE    Result Date: 6/18/2022  EXAMINATION: ONE XRAY VIEW OF THE CHEST 6/18/2022 1:08 am COMPARISON: 06/17/2022 HISTORY: ORDERING SYSTEM PROVIDED HISTORY: ett TECHNOLOGIST PROVIDED HISTORY: ett FINDINGS: The endotracheal tube terminates 4 cm above the daly. The enteric tube courses off the field of view in the upper abdomen. No acute airspace disease, pneumothorax or effusion. 1.  Endotracheal tube terminates 4 cm above the daly. 2.  The enteric tube courses off the field of view in the upper abdomen. 3.  No acute airspace disease identified. XR CHEST PORTABLE    Result Date: 6/17/2022  EXAMINATION: ONE XRAY VIEW OF THE CHEST 6/17/2022 9:58 pm COMPARISON: None. HISTORY: ORDERING SYSTEM PROVIDED HISTORY: tube placement TECHNOLOGIST PROVIDED HISTORY: tube placement FINDINGS: The endotracheal tube terminates at the level the clavicles. The enteric tube courses off the field of view in the upper abdomen. External artifacts are noted projecting over the chest.  No evidence for pneumothorax, focal consolidation or effusion. No fracture identified. 1.  Endotracheal tube terminates in appropriate position above the daly. The enteric tube courses off the field of view in the upper abdomen. 2.  No focal airspace disease identified.             Assessment and Plan     Patient Active Problem List   Diagnosis    Depression with suicidal ideation    Alcohol abuse    Respiratory distress               Plan:  Neuro:   Patient intubated, sedated, sedated with fentanyl, propofol      Resp:   PRVC, rate of 18, tidal volume 510, PEEP of 5, FiO2 30%, venous blood gas shows pH 7.329, PCO2 50.9, PO2 33.3, bicarb 26.8   CT chest pulmonary embolism pending    Vent Information  Ventilator ID: [id number]    CV:   Heart rate in the 70s, map on blood pressure cuff 73   Plan to follow-up cardiac echo in the morning    GI/Nutrition:   N.p.o. at this time    /Fluids/Electrolytes:   Patient has hypokalemia, potassium sliding scale ordered, ionized calcium ordered    Heme:   Patient placed on Lovenox for DVT prophylaxis, WBC 7, hemoglobin 13.3, hematocrit 39.2, platelets 202    ID:   Continue to monitor white count, patient afebrile at time of evaluation, normal WBC    Endo:   Patient's glucose 94    MSK:   No acute abnormality    Skin:   No acute abnormality noted    Prophylaxis:   DVT: Lovenox   GI: Pepcid    Dispo:   Admit to the ICU        Krystyna Gaston MD   Internal Medicine - PGY-3  Intensive Care Unit  6/18/2022, 2:21 AM

## 2022-06-19 VITALS
SYSTOLIC BLOOD PRESSURE: 120 MMHG | WEIGHT: 159.83 LBS | TEMPERATURE: 98.5 F | OXYGEN SATURATION: 98 % | DIASTOLIC BLOOD PRESSURE: 80 MMHG | BODY MASS INDEX: 23.67 KG/M2 | RESPIRATION RATE: 20 BRPM | HEART RATE: 80 BPM | HEIGHT: 69 IN

## 2022-06-19 PROBLEM — R91.1 LUNG NODULE: Status: ACTIVE | Noted: 2022-06-19

## 2022-06-19 LAB
ABSOLUTE EOS #: 0.13 K/UL (ref 0–0.44)
ABSOLUTE IMMATURE GRANULOCYTE: <0.03 K/UL (ref 0–0.3)
ABSOLUTE LYMPH #: 1.59 K/UL (ref 1.1–3.7)
ABSOLUTE MONO #: 0.57 K/UL (ref 0.1–1.2)
ALBUMIN SERPL-MCNC: 3.6 G/DL (ref 3.5–5.2)
ALBUMIN/GLOBULIN RATIO: 2 (ref 1–2.5)
ALP BLD-CCNC: 63 U/L (ref 40–129)
ALT SERPL-CCNC: 26 U/L (ref 5–41)
ANION GAP SERPL CALCULATED.3IONS-SCNC: 10 MMOL/L (ref 9–17)
AST SERPL-CCNC: 25 U/L
BASOPHILS # BLD: 1 % (ref 0–2)
BASOPHILS ABSOLUTE: 0.05 K/UL (ref 0–0.2)
BILIRUB SERPL-MCNC: 0.62 MG/DL (ref 0.3–1.2)
BUN BLDV-MCNC: 8 MG/DL (ref 6–20)
CALCIUM SERPL-MCNC: 8.3 MG/DL (ref 8.6–10.4)
CHLORIDE BLD-SCNC: 107 MMOL/L (ref 98–107)
CO2: 20 MMOL/L (ref 20–31)
CREAT SERPL-MCNC: 0.94 MG/DL (ref 0.7–1.2)
EOSINOPHILS RELATIVE PERCENT: 2 % (ref 1–4)
GFR AFRICAN AMERICAN: >60 ML/MIN
GFR NON-AFRICAN AMERICAN: >60 ML/MIN
GFR SERPL CREATININE-BSD FRML MDRD: ABNORMAL ML/MIN/{1.73_M2}
GLUCOSE BLD-MCNC: 82 MG/DL (ref 70–99)
HCT VFR BLD CALC: 40.6 % (ref 40.7–50.3)
HEMOGLOBIN: 13.3 G/DL (ref 13–17)
IMMATURE GRANULOCYTES: 0 %
LYMPHOCYTES # BLD: 20 % (ref 24–43)
MCH RBC QN AUTO: 32 PG (ref 25.2–33.5)
MCHC RBC AUTO-ENTMCNC: 32.8 G/DL (ref 28.4–34.8)
MCV RBC AUTO: 97.8 FL (ref 82.6–102.9)
MONOCYTES # BLD: 7 % (ref 3–12)
NRBC AUTOMATED: 0 PER 100 WBC
PDW BLD-RTO: 13.2 % (ref 11.8–14.4)
PLATELET # BLD: 207 K/UL (ref 138–453)
PMV BLD AUTO: 11.1 FL (ref 8.1–13.5)
POTASSIUM SERPL-SCNC: 4.2 MMOL/L (ref 3.7–5.3)
RBC # BLD: 4.15 M/UL (ref 4.21–5.77)
SEG NEUTROPHILS: 70 % (ref 36–65)
SEGMENTED NEUTROPHILS ABSOLUTE COUNT: 5.62 K/UL (ref 1.5–8.1)
SODIUM BLD-SCNC: 137 MMOL/L (ref 135–144)
TOTAL PROTEIN: 5.4 G/DL (ref 6.4–8.3)
WBC # BLD: 8 K/UL (ref 3.5–11.3)

## 2022-06-19 PROCEDURE — 2580000003 HC RX 258: Performed by: STUDENT IN AN ORGANIZED HEALTH CARE EDUCATION/TRAINING PROGRAM

## 2022-06-19 PROCEDURE — 85025 COMPLETE CBC W/AUTO DIFF WBC: CPT

## 2022-06-19 PROCEDURE — 93005 ELECTROCARDIOGRAM TRACING: CPT | Performed by: INTERNAL MEDICINE

## 2022-06-19 PROCEDURE — 6370000000 HC RX 637 (ALT 250 FOR IP)

## 2022-06-19 PROCEDURE — 36415 COLL VENOUS BLD VENIPUNCTURE: CPT

## 2022-06-19 PROCEDURE — 99239 HOSP IP/OBS DSCHRG MGMT >30: CPT | Performed by: INTERNAL MEDICINE

## 2022-06-19 PROCEDURE — 80053 COMPREHEN METABOLIC PANEL: CPT

## 2022-06-19 PROCEDURE — 6360000002 HC RX W HCPCS

## 2022-06-19 RX ORDER — MULTIVITAMIN WITH IRON
1 TABLET ORAL DAILY
Status: DISCONTINUED | OUTPATIENT
Start: 2022-06-19 | End: 2022-06-19 | Stop reason: HOSPADM

## 2022-06-19 RX ORDER — FOLIC ACID 1 MG/1
1 TABLET ORAL DAILY
Status: DISCONTINUED | OUTPATIENT
Start: 2022-06-19 | End: 2022-06-19 | Stop reason: HOSPADM

## 2022-06-19 RX ORDER — FOLIC ACID 1 MG/1
1 TABLET ORAL DAILY
Qty: 30 TABLET | Refills: 3 | Status: SHIPPED | OUTPATIENT
Start: 2022-06-20

## 2022-06-19 RX ORDER — LANOLIN ALCOHOL/MO/W.PET/CERES
100 CREAM (GRAM) TOPICAL DAILY
Status: DISCONTINUED | OUTPATIENT
Start: 2022-06-19 | End: 2022-06-19 | Stop reason: HOSPADM

## 2022-06-19 RX ORDER — MAGNESIUM SULFATE IN WATER 40 MG/ML
2000 INJECTION, SOLUTION INTRAVENOUS ONCE
Status: COMPLETED | OUTPATIENT
Start: 2022-06-19 | End: 2022-06-19

## 2022-06-19 RX ADMIN — ALCOHOL 1 TABLET: 70.47 GEL TOPICAL at 09:56

## 2022-06-19 RX ADMIN — SODIUM CHLORIDE: 9 INJECTION, SOLUTION INTRAVENOUS at 01:27

## 2022-06-19 RX ADMIN — FOLIC ACID 1 MG: 1 TABLET ORAL at 09:56

## 2022-06-19 RX ADMIN — Medication 100 MG: at 09:56

## 2022-06-19 RX ADMIN — MAGNESIUM SULFATE 2000 MG: 2 INJECTION INTRAVENOUS at 12:56

## 2022-06-19 ASSESSMENT — PAIN SCALES - GENERAL: PAINLEVEL_OUTOF10: 0

## 2022-06-19 NOTE — PROGRESS NOTES
Critical Care - Progress Note    Patient's name:  Frederic Mcbride  Medical Record Number: [id number]  Patient's account/billing number: [de-identified]  Patient's YOB: 1985  Age: 40 y.o. Date of Admission: 6/18/2022 12:33 AM  Reason of ICU admission:   Date of History and Physical Examination: 6/19/2022    Primary Care Physician: Verna Horn  Attending Physician:    Chief complaint: Respiratory arrest, respiratory depression    Overnight events:  Patient examined at bedside in ICU. He feels well. He denies any acute events overnight. He denies CP, SOB, abdominal pain, pedal edema, gait abnormality, nausea, vomiting, skin abnormalities. He did have an episode of diarrhea this AM. Got Mg 1g yesterday. Awaiting echo > can follow up outpatient for this. QTc 509, Mg 1g given yesterday     FLUIDS: NS 100ml/h   FEEDING: regular diet   ANALGESICS: acetaminophen, d/c fentanyl   SEDATION: none   THROMBO- PROPHYLAXIS: Lovenox    MOBILIZATION: PT   HEADS UP: yes   HEMODYNAMICS: stable   ULCER PROPHYLAXIS: d/c IV pepcid   GLYCEMIC CONTROL: glucose 82   SPONTANEOUS BREATHING TRIAL: extubated 6/18   BOWEL MANAGEMENT: glycolax prn    INDWELLING CATHETER: no   DRUG DE-ESCALATION: yes    History Of Present Illness:   History was obtained from chart review. Frederic Mcbride is a 40 y.o. transfer from outside history with a history of a \"bad aortic valve \"as well as hypertension who presents from outside facility with concerns for a potential respiratory arrest.  As per [facility] note patient was found at home unresponsive, patient required respiratory support via BVM by first responders, patient did not respond to Narcan. Patient does have a history of alcohol abuse, upon arrival to the emergency department patient was intubated with concerns for respiratory depression.   No signs of external traumatic injury, patient was sedated with fentanyl, propofol, found to have an EtOH of 195, chest x-ray showed appropriate endotracheal tube placement at outside facility, CT head did not show signs of intracranial abnormality. In our emergency department his labs repeated which showed concerns for hypokalemia, patient appears intubated, sedated, not in acute distress at time upon initial evaluation    Past Medical History:        Diagnosis Date    Heart abnormality     \"bad aortic valve\"     Hypertension      Past Surgical History:  History reviewed. No pertinent surgical history. Allergies: Allergies   Allergen Reactions    Bee Pollen          Home Medications:   Prior to Admission medications    Medication Sig Start Date End Date Taking? Authorizing Provider   traZODone (DESYREL) 50 MG tablet Take 1 tablet by mouth nightly as needed for Sleep 7/16/21   Angelina López MD   metoprolol succinate (TOPROL XL) 50 MG extended release tablet Take 1 tablet by mouth daily 7/17/21   Angelina López MD   Multiple Vitamins-Minerals (THERAPEUTIC MULTIVITAMIN-MINERALS) tablet Take 1 tablet by mouth daily 7/16/21   Angelina López MD   thiamine mononitrate (THIAMINE) 100 MG tablet Take 1 tablet by mouth daily 7/16/21   Angelina López MD   ibuprofen (ADVIL;MOTRIN) 200 MG tablet Take 800 mg by mouth every 8 hours as needed for Pain (takes daily for headache)    Historical Provider, MD       Social History:   TOBACCO:   reports that he has been smoking cigarettes. He has quit using smokeless tobacco.  ETOH:   reports current alcohol use. DRUGS:  reports no history of drug use. OCCUPATION:      Family History:   History reviewed. No pertinent family history.     REVIEW OF SYSTEMS (ROS):  Review of Systems - Negative except mentioned in HPI   General ROS: negative  Psychological ROS: negative  ENT: negative  Hematological and Lymphatic ROS: negative  Endocrine ROS: negative  Breast ROS: negative  Respiratory ROS: no cough, shortness of breath, or wheezing  Cardiovascular ROS: hx of bicuspid aortic valve, no chest pain or dyspnea on exertion  Gastrointestinal ROS: diarrhea episode this AM  Genito-Urinary ROS: negative  Musculoskeletal ROS: negative  Neurological ROS: negative  Dermatological ROS: negative    Physical Exam:    Vitals: /74   Pulse 57   Temp 98.1 °F (36.7 °C) (Axillary)   Resp 15   Ht 5' 9\" (1.753 m)   Wt 159 lb 13.3 oz (72.5 kg)   SpO2 98%   BMI 23.60 kg/m²     Body weight:   Wt Readings from Last 3 Encounters:   06/19/22 159 lb 13.3 oz (72.5 kg)   06/17/22 150 lb (68 kg)   07/15/21 140 lb (63.5 kg)     Body Mass Index : Body mass index is 23.6 kg/m². PHYSICAL EXAMINATION :  Constitutional: On room air, sitting up in the chair  EENT: spontaneously open eyes, EOMI  Neck: Supple, symmetrical, trachea midline, no adenopathy, thyroid symmetric, no jvd skin normal  Respiratory: clear to auscultation, no wheezes or rales and unlabored breathing. Cardiovascular: systolic murmur heard in right mid-clavicular area, regular rate and rhythm, 2+ pulses throughout  Abdomen: soft, nontender, nondistended, no masses or organomegaly  Neurological: alert and oriented  Extremities:  peripheral pulses normal, no pedal edema, no clubbing or cyanosis    Laboratory findings:-    CBC:   Recent Labs     06/19/22  0502   WBC 8.0   HGB 13.3        BMP:    Recent Labs     06/18/22  0054 06/18/22  0115 06/18/22  0241 06/19/22  0502      < >  --  137   K 3.2*   < >  --  4.2      < >  --  107   CO2 20   < >  --  20   BUN 10   < >  --  8   CREATININE 0.97  --  0.87 0.94   GLUCOSE 97   < >  --  82    < > = values in this interval not displayed. S. Calcium:  Recent Labs     06/19/22  0502   CALCIUM 8.3*     S. Ionized Calcium:No results for input(s): IONCA in the last 72 hours. S. Magnesium:  Recent Labs     06/18/22  0054   MG 1.9     S. Phosphorus:No results for input(s): PHOS in the last 72 hours.   S. Glucose:  Recent Labs     06/18/22  0043 06/18/22  0241 06/18/22  0459   POCGLU 94 90 87     Glycosylated None. HISTORY: ORDERING SYSTEM PROVIDED HISTORY: ams TECHNOLOGIST PROVIDED HISTORY: ams Decision Support Exception - unselect if not a suspected or confirmed emergency medical condition->Emergency Medical Condition (MA) FINDINGS: BRAIN/VENTRICLES: There is no acute intracranial hemorrhage, mass effect or midline shift. No abnormal extra-axial fluid collection. The gray-white differentiation is maintained without evidence of an acute infarct. There is no evidence of hydrocephalus. ORBITS: The visualized portion of the orbits demonstrate no acute abnormality. SINUSES: Partial opacification of the left maxillary sinus is present, with wall thickening, consistent with chronic sinusitis. SOFT TISSUES/SKULL:  No acute abnormality of the visualized skull or soft tissues. Posterior midline defect in the arch of C1 is present related to failure of closure of the ossification center. Patient is intubated. No acute intracranial abnormality. XR CHEST PORTABLE    Result Date: 6/18/2022  EXAMINATION: ONE XRAY VIEW OF THE CHEST 6/18/2022 1:08 am COMPARISON: 06/17/2022 HISTORY: ORDERING SYSTEM PROVIDED HISTORY: ett TECHNOLOGIST PROVIDED HISTORY: ett FINDINGS: The endotracheal tube terminates 4 cm above the daly. The enteric tube courses off the field of view in the upper abdomen. No acute airspace disease, pneumothorax or effusion. 1.  Endotracheal tube terminates 4 cm above the daly. 2.  The enteric tube courses off the field of view in the upper abdomen. 3.  No acute airspace disease identified. XR CHEST PORTABLE    Result Date: 6/17/2022  EXAMINATION: ONE XRAY VIEW OF THE CHEST 6/17/2022 9:58 pm COMPARISON: None. HISTORY: ORDERING SYSTEM PROVIDED HISTORY: tube placement TECHNOLOGIST PROVIDED HISTORY: tube placement FINDINGS: The endotracheal tube terminates at the level the clavicles. The enteric tube courses off the field of view in the upper abdomen.   External artifacts are noted projecting over the chest.  No evidence for pneumothorax, focal consolidation or effusion. No fracture identified. 1.  Endotracheal tube terminates in appropriate position above the daly. The enteric tube courses off the field of view in the upper abdomen. 2.  No focal airspace disease identified.       Assessment and Plan     Patient Active Problem List   Diagnosis    Depression with suicidal ideation    Alcohol abuse    Respiratory distress    Encephalopathy acute       Plan:  Neuro:   Patient extubated, off sedation, alert and oriented on RA    Resp:   Extubated 6/18   SpO2 98% on RA   CT chest pulmonary embolism showed 9mm pulm nodule, biggest 9mm; f/u non-contrast chest CT at 3 months, a PET/CT, or tissue sampling; discussed findings and follow up recommendation for this    CV:   Heart rate in the 70s, map on blood pressure cuff 90   Follow-up cardiac echo can be outpatient; hx of bicuspid aortic valve    GI/Nutrition:   Regular diet    /Fluids/Electrolytes:   Patient has hypokalemia on admission, corrected to 4.2, potassium sliding scale ordered, ionized calcium 1.13    Heme:   Patient placed on Lovenox for DVT prophylaxis, WBC 8, hemoglobin 13, platelets 998    ID:   Monitor white count, patient afebrile at time of evaluation, normal WBC    Endo:   Patient's glucose 82    MSK:   No acute abnormality    Skin:   No acute abnormality noted    Prophylaxis:   DVT: Lovenox   GI: Pepcid IV d/c    Dispo:   Can be discharged to home with follow up to Primary Care Physician, Pulmonology, and Cardiology    Kaiser Puentes MD   Intensive Care Unit  6/19/2022, 7:25 AM

## 2022-06-19 NOTE — DISCHARGE SUMMARY
[facility]     Department of Internal Medicine - Critical Care Service    INPATIENT DISCHARGE SUMMARY    PATIENT IDENTIFICATION:  NAME:  Darling Estrada   :   1985  MRN:    [id number]     Acct:    [de-identified]   Admit Date:  2022  Discharge date:  No discharge date for patient encounter. Attending Provider: Emelia Armijo MD                                     Principal Problem:    Respiratory distress  Active Problems:    Encephalopathy acute    Lung nodule  Resolved Problems:    * No resolved hospital problems. *     REASON FOR HOSPITALIZATION:   Chief Complaint   Patient presents with   [facility] is a 40 y.o. transfer from outside with a history of a \"bad aortic valve \"as well as hypertension who presents from outside facility with concerns for a potential respiratory arrest.  As per [facility] note, patient was found at home unresponsive, patient required respiratory support via BVM by first responders, patient did not respond to Narcan. Patient does have a history of alcohol abuse, upon arrival to the emergency department patient was intubated with concerns for respiratory depression. No signs of external traumatic injury, patient was sedated with fentanyl, propofol, found to have an EtOH of 195, chest x-ray showed appropriate endotracheal tube placement at outside facility, CT head did not show signs of intracranial abnormality.      In our emergency department his labs repeated which showed concerns for hypokalemia, patient appears intubated, sedated, not in acute distress at time upon initial evaluation    CT showed 9 mm lung nodule and patient is advised to follow up with pulmonologist for further studies. Patient was extubated yesterday afternoon. Today he feels well and wants to go home. He denies any acute events overnight.  He denies CP, SOB, abdominal pain, pedal edema, gait abnormality, nausea, vomiting, skin abnormalities. He did have an episode of diarrhea this AM. Got Mg 1g yesterday.      Patient can get follow up echo outpatient. QTc 509, Mg 1g given yesterday. Another magnesium 2 g is ordered today. EKG is repeated with improvement. Advised patient to hold Trazodone use and follow up with primary care physician, pulmonology, and cardiology. Consults:   pulmonary/intensive care    Procedures:  intubation    Any Hospital Acquired Infections: n/a    PATIENT'S DISCHARGE CONDITION: Stable    PATIENT/FAMILY INSTRUCTIONS:   Current Discharge Medication List      START taking these medications    Details   folic acid (FOLVITE) 1 MG tablet Take 1 tablet by mouth daily  Qty: 30 tablet, Refills: 3         CONTINUE these medications which have NOT CHANGED    Details   metoprolol succinate (TOPROL XL) 50 MG extended release tablet Take 1 tablet by mouth daily  Qty: 30 tablet, Refills: 3      Multiple Vitamins-Minerals (THERAPEUTIC MULTIVITAMIN-MINERALS) tablet Take 1 tablet by mouth daily  Qty: 30 tablet, Refills: 0      thiamine mononitrate (THIAMINE) 100 MG tablet Take 1 tablet by mouth daily  Qty: 30 tablet, Refills: 0      ibuprofen (ADVIL;MOTRIN) 200 MG tablet Take 800 mg by mouth every 8 hours as needed for Pain (takes daily for headache)         STOP taking these medications       traZODone (DESYREL) 50 MG tablet Comments:   Reason for Stopping:             Activity: activity as tolerated    Diet: regular diet    Disposition: home    Instruction: Follow up with physician about trazodone .     Follow-up:  in the next few days with Mariam Reddy, follow-up with cardiology, outpatient echo, follow-up with pulmonology for lung nodule seen on CT scan    Maine Dumont MD  Emergency Medicine Resident  Critical Care Service

## 2022-06-19 NOTE — PLAN OF CARE
Problem: Discharge Planning  Goal: Discharge to home or other facility with appropriate resources  6/18/2022 2030 by Corinne Adams, RN  Outcome: Progressing  Flowsheets  Taken 6/18/2022 2000 by Corinne Adams, RN  Discharge to home or other facility with appropriate resources:   Identify barriers to discharge with patient and caregiver   Identify discharge learning needs (meds, wound care, etc)  Taken 6/18/2022 1600 by Elias Tineo RN  Discharge to home or other facility with appropriate resources:   Identify barriers to discharge with patient and caregiver   Arrange for needed discharge resources and transportation as appropriate  Taken 6/18/2022 0800 by Elias Tineo RN  Discharge to home or other facility with appropriate resources: Identify barriers to discharge with patient and caregiver     Problem: Pain  Goal: Verbalizes/displays adequate comfort level or baseline comfort level  6/18/2022 2030 by Corinne Adams, RN  Outcome: Progressing  Flowsheets  Taken 6/18/2022 2000 by Corinne Adams, RN  Verbalizes/displays adequate comfort level or baseline comfort level: Assess pain using appropriate pain scale  Taken 6/18/2022 1200 by Eilas Tineo RN  Verbalizes/displays adequate comfort level or baseline comfort level:   Assess pain using appropriate pain scale   Administer analgesics based on type and severity of pain and evaluate response   Implement non-pharmacological measures as appropriate and evaluate response   Consider cultural and social influences on pain and pain management  Taken 6/18/2022 0800 by Elias Tineo RN  Verbalizes/displays adequate comfort level or baseline comfort level:   Administer analgesics based on type and severity of pain and evaluate response   Assess pain using appropriate pain scale   Implement non-pharmacological measures as appropriate and evaluate response   Consider cultural and social influences on pain and pain management   Notify Licensed Independent Practitioner if interventions unsuccessful or patient reports new pain     Problem: Neurosensory - Adult  Goal: Achieves maximal functionality and self care  6/18/2022 2030 by Julian Washington RN  Outcome: Progressing  Flowsheets  Taken 6/18/2022 2000 by Julian Washington RN  Achieves maximal functionality and self care: Monitor swallowing and airway patency with patient fatigue and changes in neurological status  Taken 6/18/2022 1600 by Emery Hills RN  Achieves maximal functionality and self care:   Monitor swallowing and airway patency with patient fatigue and changes in neurological status   Encourage and assist patient to increase activity and self care with guidance from physical therapy/occupational therapy     Problem: Cardiovascular - Adult  Goal: Maintains optimal cardiac output and hemodynamic stability  6/18/2022 2030 by Julian Washington RN  Outcome: Progressing  Flowsheets  Taken 6/18/2022 2000 by Julian Washington RN  Maintains optimal cardiac output and hemodynamic stability: Monitor blood pressure and heart rate  Taken 6/18/2022 1600 by Emery Hills RN  Maintains optimal cardiac output and hemodynamic stability:   Monitor blood pressure and heart rate   Assess for signs of decreased cardiac output   Monitor urine output and notify Licensed Independent Practitioner for values outside of normal range   Administer fluid and/or volume expanders as ordered   Administer vasoactive medications as ordered  Taken 6/18/2022 1200 by Emery Hills RN  Maintains optimal cardiac output and hemodynamic stability:   Monitor blood pressure and heart rate   Assess for signs of decreased cardiac output   Monitor urine output and notify Licensed Independent Practitioner for values outside of normal range   Administer fluid and/or volume expanders as ordered   Administer vasoactive medications as ordered  Taken 6/18/2022 0800 by Emery Hills RN  Maintains optimal cardiac output and hemodynamic stability: Monitor blood pressure and heart rate   Monitor urine output and notify Licensed Independent Practitioner for values outside of normal range   Assess for signs of decreased cardiac output   Administer fluid and/or volume expanders as ordered   Administer vasoactive medications as ordered     Problem: Gastrointestinal - Adult  Goal: Maintains or returns to baseline bowel function  6/18/2022 2030 by Miguel Angel Goldman RN  Outcome: Progressing  Flowsheets  Taken 6/18/2022 2000 by Miguel Angel Goldman RN  Maintains or returns to baseline bowel function: Assess bowel function  Taken 6/18/2022 1600 by Laura Cuadra RN  Maintains or returns to baseline bowel function:   Assess bowel function   Encourage oral fluids to ensure adequate hydration   Administer IV fluids as ordered to ensure adequate hydration   Administer ordered medications as needed   Encourage mobilization and activity  Taken 6/18/2022 0800 by Laura Cuadra RN  Maintains or returns to baseline bowel function:   Assess bowel function   Administer IV fluids as ordered to ensure adequate hydration   Administer ordered medications as needed   Nutrition consult to assist patient with appropriate food choices     Problem: Gastrointestinal - Adult  Goal: Maintains adequate nutritional intake  6/18/2022 2030 by Miguel Angel Goldman RN  Outcome: Progressing  Flowsheets  Taken 6/18/2022 2000 by Miguel Angel Goldman RN  Maintains adequate nutritional intake: Monitor intake and output, weight and lab values  Taken 6/18/2022 1600 by Emery Hills RN  Maintains adequate nutritional intake:   Monitor percentage of each meal consumed   Identify factors contributing to decreased intake, treat as appropriate   Assist with meals as needed   Monitor intake and output, weight and lab values   Obtain nutritional consult as needed  Taken 6/18/2022 0800 by Emery Hills RN  Maintains adequate nutritional intake:   Monitor intake and output, weight and lab values   Obtain nutritional consult as needed     Problem: Genitourinary - Adult  Goal: Absence of urinary retention  6/18/2022 2030 by Vera Yang RN  Outcome: Progressing  Flowsheets  Taken 6/18/2022 2000 by Vera Yang RN  Absence of urinary retention: Assess patients ability to void and empty bladder  Taken 6/18/2022 1600 by Lisbeth Estevez RN  Absence of urinary retention:   Assess patients ability to void and empty bladder   Monitor intake/output and perform bladder scan as needed   Place urinary catheter per Licensed Independent Practitioner order if needed   Discuss with Licensed Independent Practitioner  medications to alleviate retention as needed   Discuss catheterization for long term situations as appropriate  Taken 6/18/2022 0800 by Lisbeth Estevez RN  Absence of urinary retention:   Monitor intake/output and perform bladder scan as needed   Place urinary catheter per Licensed Independent Practitioner order if needed   Discuss with Licensed Independent Practitioner  medications to alleviate retention as needed   Discuss catheterization for long term situations as appropriate     Problem: Metabolic/Fluid and Electrolytes - Adult  Goal: Electrolytes maintained within normal limits  6/18/2022 2030 by Vera Yang RN  Outcome: Progressing  Flowsheets  Taken 6/18/2022 2000 by Vera Yang RN  Electrolytes maintained within normal limits: Monitor labs and assess patient for signs and symptoms of electrolyte imbalances  Taken 6/18/2022 1600 by Emery Hills RN  Electrolytes maintained within normal limits:   Monitor labs and assess patient for signs and symptoms of electrolyte imbalances   Administer electrolyte replacement as ordered   Monitor response to electrolyte replacements, including repeat lab results as appropriate   Fluid restriction as ordered   Instruct patient on fluid and nutrition restrictions as appropriate  Taken 6/18/2022 0800 by Emery Hills RN  Electrolytes maintained within normal limits: Monitor labs and assess patient for signs and symptoms of electrolyte imbalances   Administer electrolyte replacement as ordered   Monitor response to electrolyte replacements, including repeat lab results as appropriate   Fluid restriction as ordered     Problem: Skin/Tissue Integrity - Adult  Goal: Skin integrity remains intact  Recent Flowsheet Documentation  Taken 6/18/2022 2036 by Mario Garcia RN  Skin Integrity Remains Intact: Monitor for areas of redness and/or skin breakdown  6/18/2022 2030 by Mario Garcia RN  Outcome: Progressing  Flowsheets  Taken 6/18/2022 2000 by Mario Garcia RN  Skin Integrity Remains Intact: Monitor for areas of redness and/or skin breakdown  Taken 6/18/2022 1600 by Emery Hills RN  Skin Integrity Remains Intact: Monitor for areas of redness and/or skin breakdown  Taken 6/18/2022 0800 by Emery Hills RN  Skin Integrity Remains Intact:   Monitor for areas of redness and/or skin breakdown   Every 4-6 hours: If on nasal continuous positive airway pressure, respiratory therapy assesses nares and determine need for appliance change or resting period   Every 4-6 hours minimum: Change oxygen saturation probe site   Assess vascular access sites hourly

## 2022-06-19 NOTE — DISCHARGE INSTR - DIET

## 2022-06-19 NOTE — PLAN OF CARE
Problem: Discharge Planning  Goal: Discharge to home or other facility with appropriate resources  6/18/2022 2030 by Veda Cowden, RN  Outcome: Progressing  Flowsheets  Taken 6/18/2022 2000 by Veda Cowden, RN  Discharge to home or other facility with appropriate resources:   Identify barriers to discharge with patient and caregiver   Identify discharge learning needs (meds, wound care, etc)  Taken 6/18/2022 1600 by Laith Zamora RN  Discharge to home or other facility with appropriate resources:   Identify barriers to discharge with patient and caregiver   Arrange for needed discharge resources and transportation as appropriate  Taken 6/18/2022 0800 by Laith Zamora RN  Discharge to home or other facility with appropriate resources: Identify barriers to discharge with patient and caregiver  6/18/2022 0749 by Laith Zamora RN  Outcome: Not Progressing     Problem: Pain  Goal: Verbalizes/displays adequate comfort level or baseline comfort level  6/18/2022 2030 by Veda Cowden, RN  Outcome: Progressing  Flowsheets  Taken 6/18/2022 2000 by Veda Cowden, RN  Verbalizes/displays adequate comfort level or baseline comfort level: Assess pain using appropriate pain scale  Taken 6/18/2022 1200 by Laith Zamora RN  Verbalizes/displays adequate comfort level or baseline comfort level:   Assess pain using appropriate pain scale   Administer analgesics based on type and severity of pain and evaluate response   Implement non-pharmacological measures as appropriate and evaluate response   Consider cultural and social influences on pain and pain management  Taken 6/18/2022 0800 by Laith Zamora RN  Verbalizes/displays adequate comfort level or baseline comfort level:   Administer analgesics based on type and severity of pain and evaluate response   Assess pain using appropriate pain scale   Implement non-pharmacological measures as appropriate and evaluate response   Consider cultural and social influences on pain and pain management   Notify Licensed Independent Practitioner if interventions unsuccessful or patient reports new pain  6/18/2022 0749 by Sherrod Dandy, RN  Outcome: Not Progressing     Problem: Neurosensory - Adult  Goal: Achieves stable or improved neurological status  6/18/2022 2030 by Nathaniel Barrow RN  Outcome: Progressing  Flowsheets  Taken 6/18/2022 2000 by Nathaniel Barrow RN  Achieves stable or improved neurological status: Assess for and report changes in neurological status  Taken 6/18/2022 1600 by Emery Hills RN  Achieves stable or improved neurological status: Assess for and report changes in neurological status  6/18/2022 0749 by Sherrod Dandy, RN  Outcome: Not Progressing  Goal: Absence of seizures  6/18/2022 2030 by Nathaniel Barrow RN  Outcome: Progressing  Flowsheets  Taken 6/18/2022 2000 by Nathaniel Barrow RN  Absence of seizures: Monitor for seizure activity. If seizure occurs, document type and location of movements and any associated apnea  Taken 6/18/2022 1600 by Emery Hills RN  Absence of seizures: Monitor for seizure activity.   If seizure occurs, document type and location of movements and any associated apnea  6/18/2022 0749 by Sherrod Dandy, RN  Outcome: Not Progressing  Goal: Remains free of injury related to seizures activity  6/18/2022 2030 by Nathaniel Barrow RN  Outcome: Progressing  Flowsheets  Taken 6/18/2022 2000 by Nathaniel Barrow RN  Remains free of injury related to seizure activity: Maintain airway, patient safety  and administer oxygen as ordered  Taken 6/18/2022 1600 by Sherrod Dandy, RN  Remains free of injury related to seizure activity:   Maintain airway, patient safety  and administer oxygen as ordered   Monitor patient for seizure activity, document and report duration and description of seizure to Licensed Independent Practitioner   If seizure occurs, turn patient to side and suction secretions as needed   Reorient patient post seizure  6/18/2022 5692 by Curtis Santos RN  Outcome: Not Progressing  Goal: Achieves maximal functionality and self care  6/18/2022 2030 by Guru Muñoz RN  Outcome: Progressing  Flowsheets  Taken 6/18/2022 2000 by Guru Muñoz RN  Achieves maximal functionality and self care: Monitor swallowing and airway patency with patient fatigue and changes in neurological status  Taken 6/18/2022 1600 by Emery Hills RN  Achieves maximal functionality and self care:   Monitor swallowing and airway patency with patient fatigue and changes in neurological status   Encourage and assist patient to increase activity and self care with guidance from physical therapy/occupational therapy  6/18/2022 0749 by Curtis Santos RN  Outcome: Not Progressing     Problem: Cardiovascular - Adult  Goal: Maintains optimal cardiac output and hemodynamic stability  6/18/2022 2030 by Guru Muñoz RN  Outcome: Progressing  Flowsheets  Taken 6/18/2022 2000 by Guru Muñoz RN  Maintains optimal cardiac output and hemodynamic stability: Monitor blood pressure and heart rate  Taken 6/18/2022 1600 by Emery Hills RN  Maintains optimal cardiac output and hemodynamic stability:   Monitor blood pressure and heart rate   Assess for signs of decreased cardiac output   Monitor urine output and notify Licensed Independent Practitioner for values outside of normal range   Administer fluid and/or volume expanders as ordered   Administer vasoactive medications as ordered  Taken 6/18/2022 1200 by Emery Hills RN  Maintains optimal cardiac output and hemodynamic stability:   Monitor blood pressure and heart rate   Assess for signs of decreased cardiac output   Monitor urine output and notify Licensed Independent Practitioner for values outside of normal range   Administer fluid and/or volume expanders as ordered   Administer vasoactive medications as ordered  Taken 6/18/2022 0800 by Emery Hills RN  Maintains optimal cardiac output and hemodynamic Progressing  Flowsheets  Taken 6/18/2022 2000 by Sharath Ledesma RN  Maintains or returns to baseline bowel function: Assess bowel function  Taken 6/18/2022 1600 by Mortimer Sahara, RN  Maintains or returns to baseline bowel function:   Assess bowel function   Encourage oral fluids to ensure adequate hydration   Administer IV fluids as ordered to ensure adequate hydration   Administer ordered medications as needed   Encourage mobilization and activity  Taken 6/18/2022 0800 by Mortimer Sahara, RN  Maintains or returns to baseline bowel function:   Assess bowel function   Administer IV fluids as ordered to ensure adequate hydration   Administer ordered medications as needed   Nutrition consult to assist patient with appropriate food choices  6/18/2022 0749 by Mortimer Sahara, RN  Outcome: Not Progressing  Goal: Maintains adequate nutritional intake  6/18/2022 2030 by Sharath Ledesma RN  Outcome: Progressing  Flowsheets  Taken 6/18/2022 2000 by Sharath Ledesma RN  Maintains adequate nutritional intake: Monitor intake and output, weight and lab values  Taken 6/18/2022 1600 by Emery Hills RN  Maintains adequate nutritional intake:   Monitor percentage of each meal consumed   Identify factors contributing to decreased intake, treat as appropriate   Assist with meals as needed   Monitor intake and output, weight and lab values   Obtain nutritional consult as needed  Taken 6/18/2022 0800 by Emery Hills RN  Maintains adequate nutritional intake:   Monitor intake and output, weight and lab values   Obtain nutritional consult as needed  6/18/2022 0749 by Mortimer Sahara, RN  Outcome: Not Progressing     Problem: Genitourinary - Adult  Goal: Absence of urinary retention  6/18/2022 2030 by Sharath Ledesma RN  Outcome: Progressing  Flowsheets  Taken 6/18/2022 2000 by Sharath Ledesma RN  Absence of urinary retention: Assess patients ability to void and empty bladder  Taken 6/18/2022 1600 by Mortimer Sahara, RN  Absence of urinary retention:   Assess patients ability to void and empty bladder   Monitor intake/output and perform bladder scan as needed   Place urinary catheter per Licensed Independent Practitioner order if needed   Discuss with Licensed Independent Practitioner  medications to alleviate retention as needed   Discuss catheterization for long term situations as appropriate  Taken 6/18/2022 0800 by Uyen Monsalve RN  Absence of urinary retention:   Monitor intake/output and perform bladder scan as needed   Place urinary catheter per Licensed Independent Practitioner order if needed   Discuss with Licensed Independent Practitioner  medications to alleviate retention as needed   Discuss catheterization for long term situations as appropriate  6/18/2022 0749 by Uyen Monsalve RN  Outcome: Not Progressing  Goal: Urinary catheter remains patent  6/18/2022 2030 by Rashawn Carver RN  Outcome: Progressing  Flowsheets  Taken 6/18/2022 2000 by Rashawn Carver RN  Urinary catheter remains patent: Assess patency of urinary catheter  Taken 6/18/2022 1600 by Uyen Monsalve RN  Urinary catheter remains patent:   Assess patency of urinary catheter   Irrigate catheter per Licensed Independent Practitioner order if indicated and notify Licensed Independent Practitioner if unable to irrigate  Taken 6/18/2022 0800 by Uyen Monsalve RN  Urinary catheter remains patent: Assess patency of urinary catheter  6/18/2022 0749 by Uyen Monsalve RN  Outcome: Not Progressing     Problem: Metabolic/Fluid and Electrolytes - Adult  Goal: Electrolytes maintained within normal limits  6/18/2022 2030 by Rashawn Carver RN  Outcome: Progressing  Flowsheets  Taken 6/18/2022 2000 by Rashawn Carver RN  Electrolytes maintained within normal limits: Monitor labs and assess patient for signs and symptoms of electrolyte imbalances  Taken 6/18/2022 1600 by Emery Hills RN  Electrolytes maintained within normal limits:   Monitor labs and assess patient for signs and symptoms of electrolyte imbalances   Administer electrolyte replacement as ordered   Monitor response to electrolyte replacements, including repeat lab results as appropriate   Fluid restriction as ordered   Instruct patient on fluid and nutrition restrictions as appropriate  Taken 6/18/2022 0800 by Emery Hills RN  Electrolytes maintained within normal limits:   Monitor labs and assess patient for signs and symptoms of electrolyte imbalances   Administer electrolyte replacement as ordered   Monitor response to electrolyte replacements, including repeat lab results as appropriate   Fluid restriction as ordered  6/18/2022 0749 by Fuad Roe RN  Outcome: Not Progressing  Goal: Hemodynamic stability and optimal renal function maintained  6/18/2022 2030 by Dominga Tripp RN  Outcome: Progressing  Flowsheets  Taken 6/18/2022 2000 by Dominga Tripp RN  Hemodynamic stability and optimal renal function maintained:   Monitor labs and assess for signs and symptoms of volume excess or deficit   Monitor intake, output and patient weight   Monitor response to interventions for patient's volume status, including labs, urine output, blood pressure (other measures as available)  Taken 6/18/2022 1600 by Emery Hills RN  Hemodynamic stability and optimal renal function maintained:   Monitor labs and assess for signs and symptoms of volume excess or deficit   Monitor intake, output and patient weight   Monitor urine specific gravity, serum osmolarity and serum sodium as indicated or ordered   Monitor response to interventions for patient's volume status, including labs, urine output, blood pressure (other measures as available)   Encourage oral intake as appropriate   Instruct patient on fluid and nutrition restrictions as appropriate  Taken 6/18/2022 0800 by Emery Hills RN  Hemodynamic stability and optimal renal function maintained: Monitor labs and assess for signs and symptoms of volume excess or deficit  6/18/2022 0749 by Mack Crane RN  Outcome: Not Progressing  Goal: Glucose maintained within prescribed range  6/18/2022 2030 by Stephanie Mitchell RN  Outcome: Progressing  Flowsheets  Taken 6/18/2022 2000 by Stephanie Mitchell RN  Glucose maintained within prescribed range: Monitor blood glucose as ordered  Taken 6/18/2022 1600 by Mack Crane RN  Glucose maintained within prescribed range:   Monitor blood glucose as ordered   Assess for signs and symptoms of hyperglycemia and hypoglycemia   Administer ordered medications to maintain glucose within target range   Assess barriers to adequate nutritional intake and initiate nutrition consult as needed  Taken 6/18/2022 0800 by Mack Crane RN  Glucose maintained within prescribed range: Monitor blood glucose as ordered  6/18/2022 0749 by Mack Crane RN  Outcome: Not Progressing     Problem: Skin/Tissue Integrity - Adult  Goal: Skin integrity remains intact  6/18/2022 2030 by Stephanie Mitchell RN  Outcome: Progressing  Flowsheets  Taken 6/18/2022 2000 by Stephanie Mitchell RN  Skin Integrity Remains Intact: Monitor for areas of redness and/or skin breakdown  Taken 6/18/2022 1600 by Emery Hills RN  Skin Integrity Remains Intact: Monitor for areas of redness and/or skin breakdown  Taken 6/18/2022 0800 by Emery Hills RN  Skin Integrity Remains Intact:   Monitor for areas of redness and/or skin breakdown   Every 4-6 hours: If on nasal continuous positive airway pressure, respiratory therapy assesses nares and determine need for appliance change or resting period   Every 4-6 hours minimum: Change oxygen saturation probe site   Assess vascular access sites hourly  6/18/2022 0749 by Mack Crane RN  Outcome: Not Progressing  Flowsheets (Taken 6/18/2022 0749)  Skin Integrity Remains Intact:   Assess vascular access sites hourly   Monitor for areas of redness and/or skin breakdown   Every 4-6 hours minimum: Change oxygen saturation probe site   Every 4-6 hours: If on nasal continuous positive airway pressure, respiratory therapy assesses nares and determine need for appliance change or resting period  Goal: Incisions, wounds, or drain sites healing without S/S of infection  6/18/2022 2030 by Alexis Wetzel RN  Outcome: Progressing  Flowsheets  Taken 6/18/2022 2000 by Alexis Wetzel RN  Incisions, Wounds, or Drain Sites Healing Without Sign and Symptoms of Infection: TWICE DAILY: Assess and document skin integrity  Taken 6/18/2022 0800 by Emery Hills RN  Incisions, Wounds, or Drain Sites Healing Without Sign and Symptoms of Infection:   TWICE DAILY: Assess and document skin integrity   Implement wound care per orders   Initiate pressure ulcer prevention bundle as indicated  6/18/2022 0749 by Danni Mays RN  Outcome: Not Progressing  Flowsheets (Taken 6/18/2022 0749)  Incisions, Wounds, or Drain Sites Healing Without Sign and Symptoms of Infection:   TWICE DAILY: Assess and document skin integrity   TWICE DAILY: Assess and document dressing/incision, wound bed, drain sites and surrounding tissue  Goal: Oral mucous membranes remain intact  6/18/2022 2030 by Alexis Wetzel RN  Outcome: Progressing  Flowsheets  Taken 6/18/2022 2000 by Alexis Wetzel RN  Oral Mucous Membranes Remain Intact: Implement preventative oral hygiene regimen  Taken 6/18/2022 1600 by Emery Hills RN  Oral Mucous Membranes Remain Intact:   Assess oral mucosa and hygiene practices   Implement preventative oral hygiene regimen   Implement oral medicated treatments as ordered  Taken 6/18/2022 0800 by Danni Mays RN  Oral Mucous Membranes Remain Intact: Assess oral mucosa and hygiene practices  6/18/2022 0749 by Danni Mays RN  Outcome: Not Progressing  Flowsheets (Taken 6/18/2022 0749)  Oral Mucous Membranes Remain Intact:   Assess oral mucosa and hygiene practices   Implement preventative oral hygiene regimen   Implement oral medicated treatments as ordered     Problem: Hematologic - Adult  Goal: Maintains hematologic stability  6/18/2022 2030 by Emily Sharpe RN  Outcome: Progressing  Flowsheets  Taken 6/18/2022 2000 by Emily Sharpe RN  Maintains hematologic stability:   Assess for signs and symptoms of bleeding or hemorrhage   Monitor labs for bleeding or clotting disorders  Taken 6/18/2022 1600 by Debora Funez RN  Maintains hematologic stability:   Assess for signs and symptoms of bleeding or hemorrhage   Monitor labs for bleeding or clotting disorders  Taken 6/18/2022 0800 by Debora Funez RN  Maintains hematologic stability:   Assess for signs and symptoms of bleeding or hemorrhage   Monitor labs for bleeding or clotting disorders   Administer blood products/factors as ordered  6/18/2022 0749 by Debora Funez RN  Outcome: Not Progressing     Problem: Musculoskeletal - Adult  Goal: Return mobility to safest level of function  6/18/2022 2030 by Emily Sharpe RN  Outcome: Progressing  Flowsheets  Taken 6/18/2022 2000 by Emily Sharpe RN  Return Mobility to Safest Level of Function: Assess patient stability and activity tolerance for standing, transferring and ambulating with or without assistive devices  Taken 6/18/2022 1600 by Debora Funez RN  Return Mobility to Safest Level of Function:   Assess patient stability and activity tolerance for standing, transferring and ambulating with or without assistive devices   Assist with transfers and ambulation using safe patient handling equipment as needed   Ensure adequate protection for wounds/incisions during mobilization   Instruct patient/family in ordered activity level   Apply continuous passive motion per provider or physical therapy orders to increase flexion toward goal   Obtain physical therapy/occupational therapy consults as needed  Taken 6/18/2022 0800 by Debora Funez RN  Return Mobility to Safest Level of Function:   Assess patient stability and activity tolerance for standing, transferring and ambulating with or without assistive devices   Assist with transfers and ambulation using safe patient handling equipment as needed   Ensure adequate protection for wounds/incisions during mobilization   Obtain physical therapy/occupational therapy consults as needed   Apply continuous passive motion per provider or physical therapy orders to increase flexion toward goal  6/18/2022 0749 by Jorge Leigh RN  Outcome: Not Progressing  Goal: Maintain proper alignment of affected body part  6/18/2022 2030 by Analy Rivers RN  Outcome: Progressing  Flowsheets  Taken 6/18/2022 2000 by Analy Rivers RN  Maintain proper alignment of affected body part: Support and protect limb and body alignment per provider's orders  Taken 6/18/2022 1600 by Jorge Leigh RN  Maintain proper alignment of affected body part:   Support and protect limb and body alignment per provider's orders   Instruct and reinforce with patient and family use of appropriate assistive device and precautions (e.g. spinal or hip dislocation precautions)  Taken 6/18/2022 0800 by Jorge Leigh RN  Maintain proper alignment of affected body part: Support and protect limb and body alignment per provider's orders  6/18/2022 0749 by Jorge Leigh RN  Outcome: Not Progressing  Goal: Return ADL status to a safe level of function  6/18/2022 2030 by Analy Rivers RN  Outcome: Progressing  Flowsheets  Taken 6/18/2022 2000 by Analy Rivers RN  Return ADL Status to a Safe Level of Function: Administer medication as ordered  Taken 6/18/2022 1600 by Jorge Leigh RN  Return ADL Status to a Safe Level of Function:   Administer medication as ordered   Assess activities of daily living deficits and provide assistive devices as needed   Assist and instruct patient to increase activity and self care as tolerated  Taken 6/18/2022 0800 by Jorge Leigh RN  Return ADL Status to a Safe Level of Function: Administer medication as ordered  6/18/2022 0749 by Emery MARYCARMEN Hills  Outcome: Not Progressing     Problem: Skin/Tissue Integrity  Goal: Absence of new skin breakdown  Description: 1. Monitor for areas of redness and/or skin breakdown  2. Assess vascular access sites hourly  3. Every 4-6 hours minimum:  Change oxygen saturation probe site  4. Every 4-6 hours:  If on nasal continuous positive airway pressure, respiratory therapy assess nares and determine need for appliance change or resting period.   Outcome: Progressing     Problem: Safety - Adult  Goal: Free from fall injury  Outcome: Progressing     Problem: Nutrition Deficit:  Goal: Optimize nutritional status  Outcome: Progressing    Electronically signed by Costa Matos RN on 6/18/2022 at 8:30 PM

## 2022-06-19 NOTE — CARE COORDINATION
Case Management Initial Discharge Plan  Dimitris Estrada,             Met with:patient to discuss discharge plans. Information verified: address, contacts, phone number, , insurance Yes  Insurance Provider: Jojo Edwards: No    Emergency Contact/Next of Kin name & number: Mother Katie Leonard as per face sheet  Who are involved in patient's support system? Lives with girlfriend    PCP: Lacho Segundo  Date of last visit: one year      Discharge Planning    Living Arrangements:    lives with gf  Home has 2 stories  2 stairs to climb to get into front door, flight of stairs to climb to reach second floor  Location of bedroom/bathroom in home sleeps on couch    Patient able to perform ADL's:Independent    Current Services (outpatient & in home) DME  DME equipment: none  DME provider: na    Is patient receiving oral anticoagulation therapy? No    If indicated:   Physician managing anticoagulation treatment: na  Where does patient obtain lab work for ATC treatment? na    Does patient have any issues/concerns obtaining medications? No  If yes, what are patient's concerns? Is there a preferred Pharmacy after hours or on weekends? Yes    If yes, which pharmacy?  [facility] in [place]  Potential Assistance Needed:       Patient agreeable to home care: No  [place] of choice provided:  n/a    Prior SNF/Rehab Placement and Facility: none  Agreeable to SNF/Rehab: No  [place] of choice provided: n/a     Evaluation: no    Expected Discharge date:       Patient expects to be discharged to:    home  If home: is the family and/or caregiver wiling & able to provide support at home? yes  Who will be providing this support? girlfriend*    Follow Up Appointment: Best Day/ Time:      Transportation provider:   Transportation arrangements needed for discharge: No    Readmission Risk              Risk of Unplanned Readmission:  9             Does patient have a readmission risk score greater than 14?: No  If yes, follow-up appointment must be made within 7 days of discharge.      Goals of Care: self care      Educated pt on transitional options, provided freedom of choice and are agreeable with plan      Discharge Plan: goal is home, has transportation          Electronically signed by Jaimee Tee RN on 6/19/22 at 11:27 AM EDT

## 2022-06-20 ENCOUNTER — TELEPHONE (OUTPATIENT)
Dept: PULMONOLOGY | Age: 37
End: 2022-06-20

## 2022-06-20 NOTE — TELEPHONE ENCOUNTER
----- Message from Fouzia Bustamante sent at 6/20/2022  7:03 AM EDT -----  Ron Cook, please see message from Dr Benjamín Nick and call to schedule. Thank you.   ----- Message -----  From: Eric Greco MD  Sent: 6/19/2022   4:23 PM EDT  To: [facility] Respiratory Spec Clinical Staff     Please have the patient follow-up with me in 6 to 8 weeks for lung nodule.   Thank you

## 2022-06-23 LAB
CULTURE: NORMAL
CULTURE: NORMAL
EKG ATRIAL RATE: 62 BPM
EKG P AXIS: 65 DEGREES
EKG P-R INTERVAL: 136 MS
EKG Q-T INTERVAL: 454 MS
EKG QRS DURATION: 74 MS
EKG QTC CALCULATION (BAZETT): 460 MS
EKG R AXIS: 79 DEGREES
EKG T AXIS: 55 DEGREES
EKG VENTRICULAR RATE: 62 BPM
Lab: NORMAL
Lab: NORMAL
SPECIMEN DESCRIPTION: NORMAL
SPECIMEN DESCRIPTION: NORMAL

## 2022-06-23 PROCEDURE — 93010 ELECTROCARDIOGRAM REPORT: CPT | Performed by: INTERNAL MEDICINE

## 2022-08-03 ENCOUNTER — OFFICE VISIT (OUTPATIENT)
Dept: PULMONOLOGY | Age: 37
End: 2022-08-03
Payer: COMMERCIAL

## 2022-08-03 VITALS
HEIGHT: 69 IN | RESPIRATION RATE: 18 BRPM | SYSTOLIC BLOOD PRESSURE: 117 MMHG | HEART RATE: 55 BPM | OXYGEN SATURATION: 98 % | DIASTOLIC BLOOD PRESSURE: 72 MMHG | BODY MASS INDEX: 23.6 KG/M2

## 2022-08-03 DIAGNOSIS — F17.200 SMOKING: ICD-10-CM

## 2022-08-03 DIAGNOSIS — I35.1 AORTIC VALVE INSUFFICIENCY, ETIOLOGY OF CARDIAC VALVE DISEASE UNSPECIFIED: ICD-10-CM

## 2022-08-03 DIAGNOSIS — R91.1 LUNG NODULE SEEN ON IMAGING STUDY: Primary | ICD-10-CM

## 2022-08-03 PROCEDURE — 99214 OFFICE O/P EST MOD 30 MIN: CPT | Performed by: INTERNAL MEDICINE

## 2022-08-03 NOTE — PROGRESS NOTES
PULMONARY OP  PROGRESS NOTE      Patient:  Rachel Estrada  YOB: 1985    MRN: [id number]     Acct:        Pt seen and Chart reviewed. Rachel Estrada is here in followup for   1. Lung nodule seen on imaging study    2. Smoking    3. Aortic valve insufficiency, etiology of cardiac valve disease unspecified          Pt  has not been hospitalizedor been to er since last evaluated by us  Has been using meds as recommended. Continues to smoke  Denied any weight loss or loss of appetite  No fever or chills or night sweats  No shortness of breath or wheezing  No orthopnea, PND or increasing pedal edema  No chest pain or pressure  No cough or sputum production  No hemoptysis    Subjective:  Review of Systems    Review of Systems -   General ROS: Completed and except as mentioned above were negative   Psychological ROS:  Completed and except as mentioned above were negative  Ophthalmic ROS:  Completed and except as mentioned above were negative  ENT ROS:  Completed and except as mentioned above were negative  Allergy and Immunology ROS:  Completed and except as mentioned above werenegative  Hematological and Lymphatic ROS:  Completed and except as mentioned above were negative  Endocrine ROS: Completed and except as mentioned above were negative  Respiratory ROS:  Completed and except asmentioned above were negative  Cardiovascular ROS:  Completed and except as mentioned above were negative  Gastrointestinal ROS: Completed and except as mentioned above were negative  Genito-Urinary ROS:  Completedand except as mentioned above were negative  Musculoskeletal ROS:  Completed and except as mentioned above were negative  Neurological ROS:  Completed and except as mentioned above were negative  Dermatological ROS:Completed and except as mentioned above were negative    SLEEP  No epistaxis or sore throat. does not have fatigue, tiredness or sleepiness in am.    No MVA. No edema. Allergies:   Allergies   Allergen Reactions    Bee Pollen        Medications:    Current Outpatient Medications:     folic acid (FOLVITE) 1 MG tablet, Take 1 tablet by mouth daily (Patient not taking: Reported on 8/3/2022), Disp: 30 tablet, Rfl: 3    metoprolol succinate (TOPROL XL) 50 MG extended release tablet, Take 1 tablet by mouth daily (Patient not taking: Reported on 8/3/2022), Disp: 30 tablet, Rfl: 3    Multiple Vitamins-Minerals (THERAPEUTIC MULTIVITAMIN-MINERALS) tablet, Take 1 tablet by mouth daily (Patient not taking: Reported on 8/3/2022), Disp: 30 tablet, Rfl: 0    thiamine mononitrate (THIAMINE) 100 MG tablet, Take 1 tablet by mouth daily (Patient not taking: Reported on 8/3/2022), Disp: 30 tablet, Rfl: 0    ibuprofen (ADVIL;MOTRIN) 200 MG tablet, Take 800 mg by mouth every 8 hours as needed for Pain (takes daily for headache) (Patient not taking: Reported on 8/3/2022), Disp: , Rfl:       Objective:    Physical Exam:  Vitals: /72 (Site: Right Upper Arm, Position: Sitting, Cuff Size: Medium Adult)   Pulse 55   Resp 18   Ht 5' 9\" (1.753 m)   SpO2 98%   BMI 23.60 kg/m²   Last 3 weights: Wt Readings from Last 3 Encounters:   06/19/22 159 lb 13.3 oz (72.5 kg)   06/17/22 150 lb (68 kg)   07/15/21 140 lb (63.5 kg)     Body mass index is 23.6 kg/m². Physical Examination:   PHYSICAL EXAMINATION:    Vitals:    08/03/22 1100   BP: 117/72   Site: Right Upper Arm   Position: Sitting   Cuff Size: Medium Adult   Pulse: 55   Resp: 18   SpO2: 98%   Height: 5' 9\" (1.753 m)       Constitutional: This is a well developed, well nourished, 18.5-24.9 - Normal 40y.o. year old male who is alert, oriented, cooperative and in no apparent distress. Head:normocephalic and atraumatic. EENT:  JESI. No conjunctival injections. Septum was midline, mucosa was without erythema, exudates or cobblestoning. No thrush was noted. MallampatiII (soft palate, uvula, fauces visible)  Neck: Supple without thyromegaly. No elevated JVP.  Trachea was midline. Respiratory: Chest was symmetrical without dullness to percussion. Breath sounds bilaterally were clear to auscultation. There were no wheezes, rhonchi or rales. There is no intercostal retraction or use of accessory muscles. No egophony noted. Cardiovascular: Regular without murmur, clicks, gallops or rubs. Abdomen: Slightly rounded and soft without organomegaly. No rebound, rigidity or guarding was appreciated. Lymphatic: No lymphadenopathy. Musculoskeletal: Normal curvature of the spine. No gross muscle weakness. Extremities:  does not have lower extremity edema,  no ulcerations, tenderness, varicosities or erythema. Muscle size, tone and strength are normal.  No involuntary movements are noted. Skin:  Warm and dry. Good color, turgor and pigmentation. No lesions or scars. No cyanosis or clubbing  Neurological/Psychiatric: The patient's general behavior, level of consciousness, thought content and emotional status is normal.          DATA:     Pulmonary function tests: none        CXR: REVIEWED: On 6/18/2022 showed-    1. Endotracheal tube terminates 4 cm above the daly. 2.  The enteric tube courses off the field of view in the upper abdomen. 3.  No acute airspace disease identified. CT scan of the chest for pulmonary embolism on 6/18/2022 showed-    1. No evidence for acute pulmonary embolism. 2.  Mild dependent opacities in the lung bases compatible with subsegmental   atelectasis. 3.  Apical pleuroparenchymal scarring with nodular appearance measuring up to   9 mm in the right lung apex. In the absence of prior imaging, follow-up is   recommended as described below. 4.  Endotracheal tube terminates in appropriate position. Enteric tube   terminates in the body of the stomach. RECOMMENDATIONS:   Multiple pulmonary nodules. Most severe: 9 mm solid pulmonary nodule within   the upper lobe.  Consider a non-contrast Chest CT at 3 months, a PET/CT, or   tissue sampling. IMPRESSION:   1. Lung nodule seen on imaging study    2. Smoking    3. Aortic valve insufficiency, etiology of cardiac valve disease unspecified                   PLAN:       Reviewed the chest x-ray and the CT scan of the chest  Ordered pulmonary function test and PET scan  Refills were provided -none  Recommend flu vaccination in the fall annually. Recommendations given regarding pneumococcal vaccinations. Recommend COVID-19 vaccination  Patient is not uptodate with vaccinations from pulmonary perspective. Maintain an active lifestyle. recommend smoking cessation. Jennifer Estrada was instructed on smoking cessation for greater than 10 minutes. I discussed with this patient today the risks and benefits of various tobacco cessation strategies  All the questions that the patient has had were answered to   his satisfaction. Home O2 evaluation was done. Supplemental oxygen was not indicated  After reviewing the patient's smoking history and his age patient does not meet the criteria for lung cancer screening. We'll see the patient back in 6 weeks or earlier if needed. Patient will call us if he is sick, so he can be seen sooner. Thank you for having us involved in the care of your patient. Please call us if you have any questions or concerns.           Christiano Kwong MD, MD             8/3/2022, 11:18 AM

## 2022-08-10 ENCOUNTER — HOSPITAL ENCOUNTER (OUTPATIENT)
Dept: NUCLEAR MEDICINE | Age: 37
Discharge: HOME | End: 2022-08-12
Payer: COMMERCIAL

## 2022-08-10 DIAGNOSIS — R91.1 LUNG NODULE SEEN ON IMAGING STUDY: ICD-10-CM

## 2022-08-10 LAB — GLUCOSE BLD-MCNC: 73 MG/DL (ref 75–110)

## 2022-08-10 PROCEDURE — 2580000003 HC RX 258: Performed by: INTERNAL MEDICINE

## 2022-08-10 PROCEDURE — 82947 ASSAY GLUCOSE BLOOD QUANT: CPT

## 2022-08-10 PROCEDURE — 78815 PET IMAGE W/CT SKULL-THIGH: CPT

## 2022-08-10 PROCEDURE — A9552 F18 FDG: HCPCS | Performed by: INTERNAL MEDICINE

## 2022-08-10 PROCEDURE — 3430000000 HC RX DIAGNOSTIC RADIOPHARMACEUTICAL: Performed by: INTERNAL MEDICINE

## 2022-08-10 RX ORDER — SODIUM CHLORIDE 0.9 % (FLUSH) 0.9 %
10 SYRINGE (ML) INJECTION PRN
Status: DISCONTINUED | OUTPATIENT
Start: 2022-08-10 | End: 2022-08-13 | Stop reason: HOSPADM

## 2022-08-10 RX ORDER — FLUDEOXYGLUCOSE F 18 200 MCI/ML
10 INJECTION, SOLUTION INTRAVENOUS
Status: COMPLETED | OUTPATIENT
Start: 2022-08-10 | End: 2022-08-10

## 2022-08-10 RX ADMIN — SODIUM CHLORIDE, PRESERVATIVE FREE 10 ML: 5 INJECTION INTRAVENOUS at 12:36

## 2022-08-10 RX ADMIN — FLUDEOXYGLUCOSE F 18 11.56 MILLICURIE: 200 INJECTION, SOLUTION INTRAVENOUS at 12:36

## 2022-09-15 ENCOUNTER — OFFICE VISIT (OUTPATIENT)
Dept: PULMONOLOGY | Age: 37
End: 2022-09-15
Payer: COMMERCIAL

## 2022-09-15 VITALS
WEIGHT: 132 LBS | DIASTOLIC BLOOD PRESSURE: 78 MMHG | HEART RATE: 72 BPM | OXYGEN SATURATION: 96 % | RESPIRATION RATE: 16 BRPM | SYSTOLIC BLOOD PRESSURE: 122 MMHG | BODY MASS INDEX: 19.55 KG/M2 | HEIGHT: 69 IN

## 2022-09-15 DIAGNOSIS — R91.1 LUNG NODULE SEEN ON IMAGING STUDY: Primary | ICD-10-CM

## 2022-09-15 DIAGNOSIS — I35.1 AORTIC VALVE INSUFFICIENCY, ETIOLOGY OF CARDIAC VALVE DISEASE UNSPECIFIED: ICD-10-CM

## 2022-09-15 DIAGNOSIS — F17.200 SMOKING: ICD-10-CM

## 2022-09-15 PROCEDURE — 99214 OFFICE O/P EST MOD 30 MIN: CPT | Performed by: INTERNAL MEDICINE

## 2022-09-15 PROCEDURE — 94729 DIFFUSING CAPACITY: CPT | Performed by: INTERNAL MEDICINE

## 2022-09-15 PROCEDURE — 94726 PLETHYSMOGRAPHY LUNG VOLUMES: CPT | Performed by: INTERNAL MEDICINE

## 2022-09-15 PROCEDURE — 94010 BREATHING CAPACITY TEST: CPT | Performed by: INTERNAL MEDICINE

## 2022-09-15 NOTE — PROGRESS NOTES
PULMONARY OP  PROGRESS NOTE      Patient:  Lisandra Estrada  YOB: 1985    MRN: [id number]     Acct:        Pt seen and Chart reviewed. Lisandra Estrada is here in followup for   1. Lung nodule seen on imaging study    2. Smoking    3. Aortic valve insufficiency, etiology of cardiac valve disease unspecified          Pt  has not been hospitalizedor been to er since last evaluated by us  Has been using meds as recommended. Continues to smoke  Denied any weight loss or loss of appetite  No fever or chills or night sweats  No shortness of breath or wheezing  No orthopnea, PND or increasing pedal edema  No chest pain or pressure  No cough or sputum production  No hemoptysis    Subjective:  Review of Systems    Review of Systems -   General ROS: Completed and except as mentioned above were negative   Psychological ROS:  Completed and except as mentioned above were negative  Ophthalmic ROS:  Completed and except as mentioned above were negative  ENT ROS:  Completed and except as mentioned above were negative  Allergy and Immunology ROS:  Completed and except as mentioned above werenegative  Hematological and Lymphatic ROS:  Completed and except as mentioned above were negative  Endocrine ROS: Completed and except as mentioned above were negative  Respiratory ROS:  Completed and except asmentioned above were negative  Cardiovascular ROS:  Completed and except as mentioned above were negative  Gastrointestinal ROS: Completed and except as mentioned above were negative  Genito-Urinary ROS:  Completedand except as mentioned above were negative  Musculoskeletal ROS:  Completed and except as mentioned above were negative  Neurological ROS:  Completed and except as mentioned above were negative  Dermatological ROS:Completed and except as mentioned above were negative    SLEEP  No epistaxis or sore throat. does not have fatigue, tiredness or sleepiness in am.    No MVA. No edema. Allergies:   Allergies   Allergen Reactions    Bee Pollen        Medications:    Current Outpatient Medications:     folic acid (FOLVITE) 1 MG tablet, Take 1 tablet by mouth daily, Disp: 30 tablet, Rfl: 3    metoprolol succinate (TOPROL XL) 50 MG extended release tablet, Take 1 tablet by mouth daily, Disp: 30 tablet, Rfl: 3    Multiple Vitamins-Minerals (THERAPEUTIC MULTIVITAMIN-MINERALS) tablet, Take 1 tablet by mouth daily, Disp: 30 tablet, Rfl: 0    thiamine mononitrate (THIAMINE) 100 MG tablet, Take 1 tablet by mouth daily, Disp: 30 tablet, Rfl: 0    ibuprofen (ADVIL;MOTRIN) 200 MG tablet, Take 800 mg by mouth every 8 hours as needed for Pain (takes daily for headache), Disp: , Rfl:       Objective:    Physical Exam:  Vitals: /78 (Site: Right Upper Arm, Position: Sitting, Cuff Size: Medium Adult)   Pulse 72   Resp 16   Ht 5' 9\" (1.753 m)   Wt 132 lb (59.9 kg)   SpO2 96%   BMI 19.49 kg/m²   Last 3 weights: Wt Readings from Last 3 Encounters:   09/15/22 132 lb (59.9 kg)   06/19/22 159 lb 13.3 oz (72.5 kg)   06/17/22 150 lb (68 kg)     Body mass index is 19.49 kg/m². Physical Examination:   PHYSICAL EXAMINATION:    Vitals:    09/15/22 1411   BP: 122/78   Site: Right Upper Arm   Position: Sitting   Cuff Size: Medium Adult   Pulse: 72   Resp: 16   SpO2: 96%   Weight: 132 lb (59.9 kg)   Height: 5' 9\" (1.753 m)       Constitutional: This is a well developed, well nourished, 18.5-24.9 - Normal 40y.o. year old male who is alert, oriented, cooperative and in no apparent distress. Head:normocephalic and atraumatic. EENT:  JESI. No conjunctival injections. Septum was midline, mucosa was without erythema, exudates or cobblestoning. No thrush was noted. MallampatiII (soft palate, uvula, fauces visible)  Neck: Supple without thyromegaly. No elevated JVP. Trachea was midline. Respiratory: Chest was symmetrical without dullness to percussion. Breath sounds bilaterally were clear to auscultation.  There were no wheezes, rhonchi or rales. There is no intercostal retraction or use of accessory muscles. No egophony noted. Cardiovascular: Regular without murmur, clicks, gallops or rubs. Abdomen: Slightly rounded and soft without organomegaly. No rebound, rigidity or guarding was appreciated. Lymphatic: No lymphadenopathy. Musculoskeletal: Normal curvature of the spine. No gross muscle weakness. Extremities:  does not have lower extremity edema,  no ulcerations, tenderness, varicosities or erythema. Muscle size, tone and strength are normal.  No involuntary movements are noted. Skin:  Warm and dry. Good color, turgor and pigmentation. No lesions or scars. No cyanosis or clubbing  Neurological/Psychiatric: The patient's general behavior, level of consciousness, thought content and emotional status is normal.          DATA:     Pulmonary function tests: Normal pulmonary function tests with an FEV1 of 3.47 L on 9/15/2022        CXR: REVIEWED: On 6/18/2022 showed-    1. Endotracheal tube terminates 4 cm above the daly. 2.  The enteric tube courses off the field of view in the upper abdomen. 3.  No acute airspace disease identified. CT scan of the chest for pulmonary embolism on 6/18/2022 showed-    1. No evidence for acute pulmonary embolism. 2.  Mild dependent opacities in the lung bases compatible with subsegmental   atelectasis. 3.  Apical pleuroparenchymal scarring with nodular appearance measuring up to   9 mm in the right lung apex. In the absence of prior imaging, follow-up is   recommended as described below. 4.  Endotracheal tube terminates in appropriate position. Enteric tube   terminates in the body of the stomach. RECOMMENDATIONS:   Multiple pulmonary nodules. Most severe: 9 mm solid pulmonary nodule within   the upper lobe. Consider a non-contrast Chest CT at 3 months, a PET/CT, or   tissue sampling.      PET scan was done on 8/10/2022 and it showed-    PET-CT scan within normal limits. No abnormal FDG avid lesions in the neck,   chest, abdomen or pelvis. Please note that nodules less than 9 mm or smaller   may be occult on PET-CT imaging. Therefore, follow-up chest CT in 6 months   is advised. RECOMMENDATIONS:   Advise follow-up chest CT per impression above. IMPRESSION:   1. Lung nodule seen on imaging study    2. Smoking    3. Aortic valve insufficiency, etiology of cardiac valve disease unspecified                   PLAN:       Ordered a CT scan of the chest to be done in February 2023  Reviewed pulmonary function test and PET scan  Refills were provided -none  Recommend flu vaccination in the fall annually. Recommendations given regarding pneumococcal vaccinations. Recommend COVID-19 vaccination  Patient is not uptodate with vaccinations from pulmonary perspective. Maintain an active lifestyle. recommend smoking cessation. Alondra Estrada was instructed on smoking cessation for greater than 10 minutes. I discussed with this patient today the risks and benefits of various tobacco cessation strategies  All the questions that the patient has had were answered to   his satisfaction. Home O2 evaluation was done. Supplemental oxygen was not indicated  After reviewing the patient's smoking history and his age patient does not meet the criteria for lung cancer screening. We'll see the patient back in 6 months or earlier if needed. Patient will call us if he is sick, so he can be seen sooner. Thank you for having us involved in the care of your patient. Please call us if you have any questions or concerns.           Hart Leventhal, MD, MD             9/15/2022, 2:29 PM

## 2022-09-20 NOTE — PROGRESS NOTES
PFTs were done on 9/15/2022    Spirometry without any evidence of obstructive ventilatory defect  The patient's FEV1 was 3.47 L and FVC was 4.24 L  Total lung capacity is normal at 89% predicted  Diffusing capacity is normal at 87% predicted  Flow volume loop is normal  Oxygen saturation on room air is 96%    Impression:    Normal pulmonary function test with an FEV1 of 3.47 L and FVC of 4.24 L.     Electronically signed byCee Leyva MD  9/20/2022 9:28 AM